# Patient Record
Sex: MALE | Race: WHITE | Employment: OTHER | URBAN - METROPOLITAN AREA
[De-identification: names, ages, dates, MRNs, and addresses within clinical notes are randomized per-mention and may not be internally consistent; named-entity substitution may affect disease eponyms.]

---

## 2017-02-11 ENCOUNTER — HOSPITAL ENCOUNTER (OUTPATIENT)
Dept: OTHER | Age: 47
Discharge: HOME | End: 2017-02-11
Attending: FAMILY MEDICINE | Admitting: FAMILY MEDICINE

## 2017-02-12 LAB
C-REACTIVE PROTEIN: 37.6 MG/L (ref 0–5.1)
FERRITIN: 194.7 NG/ML (ref 30–400)
IGA: 156 MG/DL (ref 70–400)
IRON SATURATION: 16 % (ref 20–50)
IRON: 49 UG/DL (ref 59–158)
TOTAL IRON BINDING CAPACITY: 297 UG/DL (ref 260–445)

## 2017-02-13 LAB — INSULIN: 42 UIU/ML

## 2017-05-11 ENCOUNTER — OFFICE VISIT (OUTPATIENT)
Dept: DERMATOLOGY | Age: 47
End: 2017-05-11

## 2017-05-11 DIAGNOSIS — L85.9 HYPERKERATOSIS: ICD-10-CM

## 2017-05-11 DIAGNOSIS — Z12.83 SCREENING EXAM FOR SKIN CANCER: ICD-10-CM

## 2017-05-11 DIAGNOSIS — L28.0 LICHEN SIMPLEX CHRONICUS: ICD-10-CM

## 2017-05-11 DIAGNOSIS — L30.4 INTERTRIGO: ICD-10-CM

## 2017-05-11 DIAGNOSIS — D22.9 MULTIPLE BENIGN NEVI: Primary | ICD-10-CM

## 2017-05-11 PROCEDURE — 99214 OFFICE O/P EST MOD 30 MIN: CPT | Performed by: DERMATOLOGY

## 2017-06-15 ENCOUNTER — TELEPHONE (OUTPATIENT)
Dept: PULMONOLOGY | Age: 47
End: 2017-06-15

## 2017-06-15 DIAGNOSIS — G47.33 OSA ON CPAP: Primary | ICD-10-CM

## 2017-06-15 DIAGNOSIS — Z99.89 OSA ON CPAP: Primary | ICD-10-CM

## 2017-08-16 ENCOUNTER — OFFICE VISIT (OUTPATIENT)
Dept: PULMONOLOGY | Age: 47
End: 2017-08-16

## 2017-08-16 VITALS
HEIGHT: 64 IN | WEIGHT: 315 LBS | SYSTOLIC BLOOD PRESSURE: 151 MMHG | DIASTOLIC BLOOD PRESSURE: 94 MMHG | RESPIRATION RATE: 16 BRPM | BODY MASS INDEX: 53.78 KG/M2 | HEART RATE: 80 BPM | TEMPERATURE: 98.4 F | OXYGEN SATURATION: 98 %

## 2017-08-16 DIAGNOSIS — G47.33 OSA ON CPAP: Primary | ICD-10-CM

## 2017-08-16 DIAGNOSIS — Z99.89 OSA ON CPAP: Primary | ICD-10-CM

## 2017-08-16 PROCEDURE — 99213 OFFICE O/P EST LOW 20 MIN: CPT | Performed by: INTERNAL MEDICINE

## 2017-08-16 ASSESSMENT — SLEEP AND FATIGUE QUESTIONNAIRES
HOW LIKELY ARE YOU TO NOD OFF OR FALL ASLEEP WHEN YOU ARE A PASSENGER IN A CAR FOR AN HOUR WITHOUT A BREAK: 2
NECK CIRCUMFERENCE (INCHES): 20
HOW LIKELY ARE YOU TO NOD OFF OR FALL ASLEEP IN A CAR, WHILE STOPPED FOR A FEW MINUTES IN TRAFFIC: 0
HOW LIKELY ARE YOU TO NOD OFF OR FALL ASLEEP WHILE WATCHING TV: 1
HOW LIKELY ARE YOU TO NOD OFF OR FALL ASLEEP WHILE LYING DOWN TO REST IN THE AFTERNOON WHEN CIRCUMSTANCES PERMIT: 2
ESS TOTAL SCORE: 5
HOW LIKELY ARE YOU TO NOD OFF OR FALL ASLEEP WHILE SITTING INACTIVE IN A PUBLIC PLACE: 0
HOW LIKELY ARE YOU TO NOD OFF OR FALL ASLEEP WHILE SITTING QUIETLY AFTER LUNCH WITHOUT ALCOHOL: 0
HOW LIKELY ARE YOU TO NOD OFF OR FALL ASLEEP WHILE SITTING AND TALKING TO SOMEONE: 0
HOW LIKELY ARE YOU TO NOD OFF OR FALL ASLEEP WHILE SITTING AND READING: 0

## 2017-10-27 ENCOUNTER — OFFICE VISIT (OUTPATIENT)
Dept: FAMILY MEDICINE CLINIC | Age: 47
End: 2017-10-27

## 2017-10-27 VITALS
BODY MASS INDEX: 53.78 KG/M2 | OXYGEN SATURATION: 95 % | DIASTOLIC BLOOD PRESSURE: 88 MMHG | WEIGHT: 315 LBS | HEIGHT: 64 IN | HEART RATE: 72 BPM | SYSTOLIC BLOOD PRESSURE: 140 MMHG

## 2017-10-27 DIAGNOSIS — Z99.89 OSA ON CPAP: ICD-10-CM

## 2017-10-27 DIAGNOSIS — R03.0 ELEVATED BLOOD PRESSURE READING: ICD-10-CM

## 2017-10-27 DIAGNOSIS — D35.2 PITUITARY MICROADENOMA (HCC): ICD-10-CM

## 2017-10-27 DIAGNOSIS — Z01.818 PRE-OP EVALUATION: Primary | ICD-10-CM

## 2017-10-27 DIAGNOSIS — G47.33 OSA ON CPAP: ICD-10-CM

## 2017-10-27 PROCEDURE — 99214 OFFICE O/P EST MOD 30 MIN: CPT | Performed by: FAMILY MEDICINE

## 2017-10-27 PROCEDURE — 93000 ELECTROCARDIOGRAM COMPLETE: CPT | Performed by: FAMILY MEDICINE

## 2017-10-27 RX ORDER — LOSARTAN POTASSIUM AND HYDROCHLOROTHIAZIDE 12.5; 5 MG/1; MG/1
1 TABLET ORAL DAILY
Qty: 30 TABLET | Refills: 5 | Status: SHIPPED | OUTPATIENT
Start: 2017-10-27 | End: 2018-04-19 | Stop reason: SDUPTHER

## 2017-10-27 ASSESSMENT — PATIENT HEALTH QUESTIONNAIRE - PHQ9
SUM OF ALL RESPONSES TO PHQ9 QUESTIONS 1 & 2: 0
SUM OF ALL RESPONSES TO PHQ QUESTIONS 1-9: 0
1. LITTLE INTEREST OR PLEASURE IN DOING THINGS: 0
2. FEELING DOWN, DEPRESSED OR HOPELESS: 0

## 2017-10-27 NOTE — PROGRESS NOTES
[address] [facility] Medicine Preoperative Evaluation       Erin C. Erie Merlin, M.D.     [address] [address], [address] [facility]     (phone) [phone]       (fax) [phone]    Dear [facility] Radiology? Preop Surgery ,        Thank you for referring Juan Antonio Gamble to me for Preoperative Evaluation -is being sedated for his MRI. Below are the relevant portions of my assessment and plan of care.     Juan Antonio Gamble    41 y.o.   1970    [address] [address]    Vitals:    10/27/17 1400 10/27/17 1424   BP: (!) 130/94 (!) 140/88   Pulse: 72    SpO2: 95%    Weight: (!) 381 lb (172.8 kg)    Height: 5' 4\" (1.626 m)       Wt Readings from Last 2 Encounters:   10/27/17 (!) 381 lb (172.8 kg)   08/16/17 (!) 371 lb (168.3 kg)     BP Readings from Last 3 Encounters:   10/27/17 (!) 140/88   08/16/17 (!) 151/94   12/30/16 136/86        Allergies   Allergen Reactions    Annalisa Quiñonez (Mag [Buffered Aspirin]      Nose bleeds     Metformin And Related Diarrhea     Current Outpatient Prescriptions   Medication Sig Dispense Refill    losartan-hydrochlorothiazide (HYZAAR) 50-12.5 MG per tablet Take 1 tablet by mouth daily 30 tablet 5    docusate sodium (COLACE) 100 MG capsule Take 1 capsule by mouth 2 times daily 60 capsule 2    ALPRAZolam (XANAX) 1 MG tablet Take 1 tablet by mouth as needed for Sleep or Anxiety Takes prn for anxiety 10 tablet 0    albuterol sulfate HFA (PROVENTIL HFA) 108 (90 BASE) MCG/ACT inhaler Inhale 2 puffs into the lungs every 4 hours as needed for Wheezing 1 Inhaler 1    Multiple Vitamins-Minerals (MULTIVITAMIN PO) Take by mouth      Fish Oil-Cholecalciferol (FISH OIL/D3 ADULT GUMMIES PO) Take 2 capsules by mouth daily      Coenzyme Q10 (COQ-10 PO) Take 2 capsules by mouth daily chewable      fluocinonide (LIDEX) 0.05 % ointment Apply sparingly to affected area(s) up to bid prn 15 g 1    HYDROcodone-acetaminophen (NORCO) 5-325 MG per tablet Take 1 tablet TIA-like symptoms. PSYCHIATRIC: No anxiety, insomnia or depression     Physical Exam   BP (!) 140/88   Pulse 72   Ht 5' 4\" (1.626 m)   Wt (!) 381 lb (172.8 kg)   SpO2 95%   BMI 65.40 kg/m²   Constitutional: Patient is oriented to person, place, and time. He appears in no distress. Head: Normocephalic and atraumatic. Right Ear: Tympanic membrane, external ear and ear canal normal.   Left Ear: Tympanic membrane, external ear and ear canal normal.   Nose: Nose normal.   Mouth/Throat: Oropharynx is clear and moist, and mucous membranes are normal.  There is no cervical adenopathy. There are no loose teeth. Eyes: Conjunctivae and extraocular motions are normal. Pupils are equal, round, and reactive to light bilaterally. Neck: Neck supple. No JVD present. No mass and no thyromegaly present. Cardiovascular: Normal rate, regular rhythm, normal heart sounds and intact distal pulses. Exam reveals no gallop and no friction rub. No murmur heard. Pulmonary/Chest: Effort normal and breath sounds normal. No respiratory distress. There are no wheezes, rhonchi or rales. Abdominal: Soft, non-tender. Normal bowel sounds and aorta. There is no organomegaly, bruit or palpable mass appreciated   Neurological: He is alert and oriented to person, place, and time. He has normal reflexes. No cranial nerve deficit. Coordination normal.   Skin: Skin is warm and dry. There is no rash or erythema. No suspicious lesions noted. Psychiatric: He has a normal mood and affect. Speech and behavior are normal. Judgment, cognition and memory are normal.     EKG Interpretation: Normal sinus rhythm no acute changes/attached    Uses CPAP at home     Labs= pending      Assessment:  Encounter Diagnoses   Name Primary?  Pre-op evaluation Yes    Pituitary microadenoma ([address]     Elevated blood pressure reading     CHESTER on CPAP         41 y.o. patient with planned surgery as above.     Known risk factors for perioperative complications: None        Plan:    1. Preoperative workup as follows: ECG and labs= pending   2. Change in medication regimen before surgery: none, continue med regimen including morning of surgery, w/sip of water. 3. Prophylaxis for cardiac events with perioperative beta-blockers: not indicated. 4. Invasive hemodynamic monitoring perioperatively: not indicated. 5. Patient is cleared for upcoming surgery. If you have questions, please do not hesitate to call me. Sincerely,        Jesse Burciaga.  Rachel Soliman M.D.

## 2017-11-06 DIAGNOSIS — R79.89 ELEVATED TSH: Primary | ICD-10-CM

## 2017-11-06 DIAGNOSIS — R73.09 ELEVATED HEMOGLOBIN A1C: ICD-10-CM

## 2017-11-06 LAB
ALBUMIN SERPL-MCNC: 3.8 G/DL (ref 3.5–5.7)
ALP BLD-CCNC: 55 U/L (ref 36–125)
ALT SERPL-CCNC: 19 U/L (ref 7–52)
ANION GAP SERPL CALCULATED.3IONS-SCNC: 9 MMOL/L (ref 3–16)
AST SERPL-CCNC: 15 U/L (ref 13–39)
BILIRUB SERPL-MCNC: 0.4 MG/DL (ref 0–1.5)
BUN BLDV-MCNC: 13 MG/DL (ref 7–25)
C-REACTIVE PROTEIN WIDE RANGE: 46.6 MG/L (ref 1–10)
CALCIUM SERPL-MCNC: 9.3 MG/DL (ref 8.6–10.3)
CHLORIDE BLD-SCNC: 102 MMOL/L (ref 98–110)
CHOLESTEROL, TOTAL: 187 MG/DL (ref 0–200)
CO2: 29 MMOL/L (ref 21–33)
CREAT SERPL-MCNC: 0.79 MG/DL (ref 0.6–1.3)
GFR, ESTIMATED: >90 SEE NOTE.
GFR, ESTIMATED: >90 SEE NOTE.
GLUCOSE BLD-MCNC: 93 MG/DL (ref 70–100)
HBA1C MFR BLD: 5.7 % (ref 4.8–6.4)
HCT VFR BLD CALC: 43.6 % (ref 38.5–50)
HDLC SERPL-MCNC: 35 MG/DL (ref 60–92)
HEMOGLOBIN: 14.6 G/DL (ref 13.2–17.1)
LDL CHOLESTEROL CALCULATED: 129 MG/DL
MCH RBC QN AUTO: 27.9 PG (ref 27–33)
MCHC RBC AUTO-ENTMCNC: 33.5 G/DL (ref 32–36)
MCV RBC AUTO: 83.5 FL (ref 80–100)
OSMOLALITY CALCULATION: 290 MOSM/KG (ref 278–305)
PDW BLD-RTO: 14.7 % (ref 11–15)
PLATELET # BLD: 235 10E3/UL (ref 140–400)
PMV BLD AUTO: 9.4 FL (ref 7.5–11.5)
POTASSIUM SERPL-SCNC: 3.8 MMOL/L (ref 3.5–5.3)
RBC # BLD: 5.23 10E6/UL (ref 4.2–5.8)
SODIUM BLD-SCNC: 140 MMOL/L (ref 133–146)
TOTAL PROTEIN: 6.4 G/DL (ref 6.4–8.9)
TRIGL SERPL-MCNC: 114 MG/DL (ref 10–149)
TSH, 3RD GENERATION: 5.66 UIU/ML (ref 0.34–5.6)
VITAMIN D 25-HYDROXY: 21.7 NG/ML (ref 30–100)
WBC # BLD: 8.3 10E3/UL (ref 3.8–10.8)

## 2017-11-10 ENCOUNTER — HOSPITAL ENCOUNTER (OUTPATIENT)
Dept: MRI IMAGING | Age: 47
Discharge: HOME | End: 2017-11-10
Attending: NEUROLOGICAL SURGERY | Admitting: NEUROLOGICAL SURGERY

## 2017-11-10 VITALS
RESPIRATION RATE: 20 BRPM | DIASTOLIC BLOOD PRESSURE: 82 MMHG | TEMPERATURE: 97.6 F | OXYGEN SATURATION: 95 % | HEART RATE: 82 BPM | SYSTOLIC BLOOD PRESSURE: 127 MMHG

## 2017-11-10 DIAGNOSIS — D35.2 BENIGN NEOPLASM OF PITUITARY GLAND (HCC): ICD-10-CM

## 2017-11-10 DIAGNOSIS — D35.2 PITUITARY ADENOMA (HCC): ICD-10-CM

## 2017-11-10 RX ORDER — OXYCODONE HYDROCHLORIDE AND ACETAMINOPHEN 5; 325 MG/1; MG/1
1 TABLET ORAL PRN
Status: ACTIVE | OUTPATIENT
Start: 2017-11-10 | End: 2017-11-10

## 2017-11-10 RX ORDER — MORPHINE SULFATE 2 MG/ML
2 INJECTION, SOLUTION INTRAMUSCULAR; INTRAVENOUS EVERY 5 MIN PRN
Status: DISCONTINUED | OUTPATIENT
Start: 2017-11-10 | End: 2017-11-11 | Stop reason: HOSPADM

## 2017-11-10 RX ORDER — FENTANYL CITRATE 50 UG/ML
25 INJECTION, SOLUTION INTRAMUSCULAR; INTRAVENOUS EVERY 5 MIN PRN
Status: DISCONTINUED | OUTPATIENT
Start: 2017-11-10 | End: 2017-11-11 | Stop reason: HOSPADM

## 2017-11-10 RX ORDER — ONDANSETRON 2 MG/ML
4 INJECTION INTRAMUSCULAR; INTRAVENOUS
Status: ACTIVE | OUTPATIENT
Start: 2017-11-10 | End: 2017-11-10

## 2017-11-10 RX ORDER — TESTOSTERONE 12.5 MG/1.25G
5 GEL TOPICAL DAILY
COMMUNITY
End: 2020-01-29

## 2017-11-10 RX ORDER — MEPERIDINE HYDROCHLORIDE 25 MG/ML
12.5 INJECTION INTRAMUSCULAR; INTRAVENOUS; SUBCUTANEOUS EVERY 5 MIN PRN
Status: DISCONTINUED | OUTPATIENT
Start: 2017-11-10 | End: 2017-11-11 | Stop reason: HOSPADM

## 2017-11-10 RX ORDER — FENTANYL CITRATE 50 UG/ML
50 INJECTION, SOLUTION INTRAMUSCULAR; INTRAVENOUS EVERY 5 MIN PRN
Status: DISCONTINUED | OUTPATIENT
Start: 2017-11-10 | End: 2017-11-11 | Stop reason: HOSPADM

## 2017-11-10 RX ORDER — MORPHINE SULFATE 2 MG/ML
1 INJECTION, SOLUTION INTRAMUSCULAR; INTRAVENOUS EVERY 5 MIN PRN
Status: DISCONTINUED | OUTPATIENT
Start: 2017-11-10 | End: 2017-11-11 | Stop reason: HOSPADM

## 2017-11-10 RX ORDER — OXYCODONE HYDROCHLORIDE AND ACETAMINOPHEN 5; 325 MG/1; MG/1
2 TABLET ORAL PRN
Status: ACTIVE | OUTPATIENT
Start: 2017-11-10 | End: 2017-11-10

## 2017-11-10 ASSESSMENT — PAIN - FUNCTIONAL ASSESSMENT: PAIN_FUNCTIONAL_ASSESSMENT: 0-10

## 2017-11-10 ASSESSMENT — LIFESTYLE VARIABLES: SMOKING_STATUS: 0

## 2017-11-10 ASSESSMENT — PAIN SCALES - GENERAL
PAINLEVEL_OUTOF10: 0
PAINLEVEL_OUTOF10: 0

## 2017-11-10 NOTE — PROGRESS NOTES
1.  Patient is identified using name and date of birth. 2.  The patient is free from signs and symptoms of injury. 3.  The patient receives appropriate medication(s), safely administered during the perioperative period. 4.  The patient had wound/tissuue perfusion consistent with or improved from baseline levels established preoperatively. 5.  The patient is at or returning to normothermia at the conclusion of the immediate postoperative period. 6.  The patient's fluid, electrolyte, and acid base balances are consistent with or improved from baseline levels established preoperatively. 7.  The patient's pulmonary function is consistent with or improved from baseline levels established preoperatively. 8.  The patient's cardiovascular status is consistent with or improved from baseline levels established preoperatively. 9.  The patient/caregiver participates in decisions affecting his or her perioperative care. 10. The patient's care is consistent with the individualized perioperative plan of care. 11. The patient's right to privacy is maintained. 12. The patient is the recipient of competent and ethical care within legal standards of practice. 13.  The patient's value system, lifestyle, ethnicity, and culture are considered, respected, and incorporated in the perioperative plan of care. 14.  The patient demonstrates and/or reports adequate pain control throughout the perioperative period. 15. The patient's neurological status is consistent with or improved from baseline levels established preoperatively. 16.  The patient/caregiver demonstrates knowledge of the expected responses to the operative or invasive procedure. 16.  Patient/caregiver has reduced anxiety. Interventions - familiarize with environment and equipment.
Alert and oriented. No c/o. Vss. Wife at bedside. Understands discharge instructions. Tolerated sitting up and po fluids well.
Dr Bibi Alvarez gave a verbal order to discharge patient.
VSS. Ok to transfer to phase 2 care.
established preoperatively. 23.  The patient/caregiver demonstrates knowledge of the expected responses to the operative or invasive procedure. 20.  Patient/Caregiver has reduced anxiety. Interventions-Familiarize with environment and equipment.   21.  Other:  22.  Other:

## 2017-11-10 NOTE — ANESTHESIA POST-OP
[facility] Department of Anesthesiology  Post-Anesthesia Note       Name:  Ruiz Gant                                         Age:  41 y.o.   MRN:  [id number]     Last Vitals & Oxygen Saturation: BP (!) 135/93   Pulse 94   Temp 97.6 °F (36.4 °C) (Temporal)   Resp 25   SpO2 95%   Patient Vitals for the past 4 hrs:   BP Temp Temp src Pulse Resp SpO2   11/10/17 1059 - - - 94 25 95 %   11/10/17 1056 - - - 85 17 99 %   11/10/17 1055 (!) 135/93 - - 89 27 100 %   11/10/17 1054 - - - 87 18 97 %   11/10/17 1053 - - - 88 20 98 %   11/10/17 1052 (!) 131/93 - - 85 23 98 %   11/10/17 1044 132/88 97.6 °F (36.4 °C) Temporal 94 12 99 %   11/10/17 0813 (!) 150/61 97.1 °F (36.2 °C) Temporal 65 18 97 %       Level of consciousness: awake, alert and oriented    Respiratory: stable     Cardiovascular: stable     Hydration: stable     PONV: stable     Post-op pain: adequate analgesia    Post-op assessment: no apparent anesthetic complications and tolerated procedure well    Complications:  none    Lizbeth Irby MD  November 10, 2017   11:25 AM

## 2017-11-10 NOTE — ANESTHESIA PRE-OP
(Current)   Vitals:    11/10/17 0813   BP: (!) 150/61   Pulse: 65   Resp: 18   Temp: 97.1 °F (36.2 °C)   SpO2: 97%     Vital Signs Statistics (for past 48 hrs)     Temp  Av.1 °F (36.2 °C)  Min: 97.1 °F (36.2 °C)   Min taken time: 11/10/17 0813  Max: 97.1 °F (36.2 °C)   Max taken time: 11/10/17 0813  Pulse  Av  Min: 72   Min taken time: 11/10/17 0813  Max: 72   Max taken time: 11/10/17 0813  Resp  Av  Min: 18   Min taken time: 11/10/17 0813  Max: 25   Max taken time: 11/10/17 0813  BP  Min: 150/61   Min taken time: 11/10/17 0813  Max: 150/61   Max taken time: 11/10/17 0813  SpO2  Av %  Min: 97 %   Min taken time: 11/10/17 0813  Max: 97 %   Max taken time: 11/10/17 0813    BP Readings from Last 3 Encounters:   11/10/17 (!) 150/61   10/27/17 (!) 140/88   17 (!) 151/94     BMI  There is no height or weight on file to calculate BMI. Estimated body mass index is 65.4 kg/m² as calculated from the following:    Height as of 10/27/17: 5' 4\" (1.626 m). Weight as of 10/27/17: 381 lb (172.8 kg).     CBC   Lab Results   Component Value Date    WBC 8.3 2017    RBC 5.23 2017    HGB 14.6 2017    HCT 43.6 2017    MCV 83.5 2017    RDW 14.7 2017     2017     CMP    Lab Results   Component Value Date     2017    K 3.8 2017     2017    CO2 29 2017    BUN 13 2017    CREATININE 0.79 2017    GFRAA >60 2016    AGRATIO 1.4 2016    LABGLOM >60 2016    LABGLOM 105 2012    GLUCOSE 93 2017    PROT 6.4 2017    PROT 5.9 2012    CALCIUM 9.3 2017    BILITOT 0.4 2017    ALKPHOS 55 2017    AST 15 2017    ALT 19 2017     BMP    Lab Results   Component Value Date     2017    K 3.8 2017     2017    CO2 29 2017    BUN 13 2017    CREATININE 0.79 2017    CALCIUM 9.3 2017    GFRAA >60 2016    LABGLOM >60 03/23/2016    LABGLOM 105 06/23/2012    GLUCOSE 93 11/06/2017     POCGlucose  No results for input(s): GLUCOSE in the last 72 hours. Coags  No results found for: PROTIME, INR, APTT  HCG (If Applicable) No results found for: PREGTESTUR, PREGSERUM, HCG, HCGQUANT   ABGs No results found for: PHART, PO2ART, NRN0HWO, CSF5TZN, BEART, Y1NSNWSG   Type & Screen (If Applicable)  No results found for: LABABO, [address]    Surgeon:    Procedure:    Medications prior to admission:   Prior to Admission medications    Medication Sig Start Date End Date Taking? Authorizing Provider   testosterone (ANDROGEL) 25 MG/2.5GM (1%) GEL 1 % gel Apply 5 g topically daily . Yes Historical Provider, MD   losartan-hydrochlorothiazide (HYZAAR) 50-12.5 MG per tablet Take 1 tablet by mouth daily 10/27/17  Yes Del Kay MD   docusate sodium (COLACE) 100 MG capsule Take 1 capsule by mouth 2 times daily 12/30/16  Yes Del Kay MD   Coenzyme Q10 (COQ-10 PO) Take 2 capsules by mouth daily chewable   Yes Historical Provider, MD   fluocinonide (LIDEX) 0.05 % ointment Apply sparingly to affected area(s) up to bid prn 1/29/15  Yes Sandra Holt MD   ALPRAZolam Alyson Single) 1 MG tablet Take 1 tablet by mouth as needed for Sleep or Anxiety Takes prn for anxiety 12/30/16   Del Kay MD   albuterol sulfate HFA (PROVENTIL HFA) 108 (90 BASE) MCG/ACT inhaler Inhale 2 puffs into the lungs every 4 hours as needed for Wheezing 12/30/16   Del Kay MD   Multiple Vitamins-Minerals (MULTIVITAMIN PO) Take by mouth    Historical Provider, MD   Fish Oil-Cholecalciferol (FISH OIL/D3 ADULT GUMMIES PO) Take 2 capsules by mouth daily    Historical Provider, MD   HYDROcodone-acetaminophen (NORCO) 5-325 MG per tablet Take 1 tablet by mouth every 8 hours as needed for Pain.  12/12/14   Del Kay MD       Current medications:    Current Outpatient Prescriptions   Medication Sig Dispense Refill    testosterone (ANDROGEL) 25 MG/2.5GM (1%) GEL 1 % gel Apply 5 g MOUTH SURGERY      gum transplant    PITUITARY SURGERY  03/2016    WISDOM TOOTH EXTRACTION         Social History:    Social History   Substance Use Topics    Smoking status: Former Smoker     Packs/day: 1.00     Years: 3.00     Types: Cigarettes     Quit date: 2/6/1990    Smokeless tobacco: Never Used    Alcohol use 0.0 oz/week      Comment: social                                Counseling given: Not Answered      Vital Signs (Current):   Vitals:    11/10/17 0813   BP: (!) 150/61   Pulse: 65   Resp: 18   Temp: 97.1 °F (36.2 °C)   TempSrc: Temporal   SpO2: 97%                                              BP Readings from Last 3 Encounters:   11/10/17 (!) 150/61   10/27/17 (!) 140/88   08/16/17 (!) 151/94       NPO Status: Time of last liquid consumption: 2200                        Time of last solid consumption: 2200                        Date of last liquid consumption: 11/09/17                        Date of last solid food consumption: 11/09/17    BMI:   Wt Readings from Last 3 Encounters:   10/27/17 (!) 381 lb (172.8 kg)   08/16/17 (!) 371 lb (168.3 kg)   12/30/16 (!) 364 lb (165.1 kg)     There is no height or weight on file to calculate BMI.     Anesthesia Evaluation  Patient summary reviewed and Nursing notes reviewed no history of anesthetic complications:   Airway: Mallampati: II  TM distance: >3 FB   Neck ROM: full  Mouth opening: > = 3 FB Dental:          Pulmonary: breath sounds clear to auscultation  (+) sleep apnea: on CPAP,  asthma (prn inhaler): seasonal asthma,                            Cardiovascular:    (+) hypertension:,     (-) pacemaker, valvular problems/murmurs, past MI, CAD, CABG/stent, dysrhythmias and  angina    ECG reviewed  Rhythm: regular  Rate: normal           Beta Blocker:  Not on Beta Blocker         Neuro/Psych:   (+) headaches:,    (-) seizures, neuromuscular disease, TIA and CVA           GI/Hepatic/Renal: neg ROS            Endo/Other: negative ROS

## 2017-12-07 ENCOUNTER — TELEPHONE (OUTPATIENT)
Dept: DERMATOLOGY | Age: 47
End: 2017-12-07

## 2017-12-07 RX ORDER — CLOBETASOL PROPIONATE 0.5 MG/G
OINTMENT TOPICAL
Qty: 60 G | Refills: 0 | Status: SHIPPED | OUTPATIENT
Start: 2017-12-07

## 2017-12-07 NOTE — TELEPHONE ENCOUNTER
Pt calling for refill of lidex oint. Will change to clobetasol oint since $0 copay through [facility].

## 2018-01-02 RX ORDER — DOCUSATE SODIUM 100 MG/1
100 CAPSULE, LIQUID FILLED ORAL 2 TIMES DAILY
Qty: 60 CAPSULE | Refills: 2 | Status: SHIPPED | OUTPATIENT
Start: 2018-01-02 | End: 2018-04-19 | Stop reason: SDUPTHER

## 2018-04-03 ENCOUNTER — TELEPHONE (OUTPATIENT)
Dept: PULMONOLOGY | Age: 48
End: 2018-04-03

## 2018-05-17 ENCOUNTER — OFFICE VISIT (OUTPATIENT)
Dept: DERMATOLOGY | Age: 48
End: 2018-05-17

## 2018-05-17 DIAGNOSIS — Z12.83 SCREENING EXAM FOR SKIN CANCER: ICD-10-CM

## 2018-05-17 DIAGNOSIS — L81.4 SOLAR LENTIGO: ICD-10-CM

## 2018-05-17 DIAGNOSIS — L72.0 EPIDERMOID CYST: ICD-10-CM

## 2018-05-17 DIAGNOSIS — L82.0 INFLAMED SEBORRHEIC KERATOSIS: ICD-10-CM

## 2018-05-17 DIAGNOSIS — D22.9 MULTIPLE BENIGN NEVI: Primary | ICD-10-CM

## 2018-05-17 PROCEDURE — 17110 DESTRUCTION B9 LES UP TO 14: CPT | Performed by: DERMATOLOGY

## 2018-05-17 PROCEDURE — 99213 OFFICE O/P EST LOW 20 MIN: CPT | Performed by: DERMATOLOGY

## 2018-05-17 RX ORDER — CHOLECALCIFEROL (VITAMIN D3) 1250 MCG
CAPSULE ORAL WEEKLY
COMMUNITY

## 2018-05-25 ENCOUNTER — OFFICE VISIT (OUTPATIENT)
Dept: PULMONOLOGY | Age: 48
End: 2018-05-25

## 2018-05-25 VITALS
OXYGEN SATURATION: 99 % | BODY MASS INDEX: 53.78 KG/M2 | WEIGHT: 315 LBS | HEIGHT: 64 IN | RESPIRATION RATE: 18 BRPM | TEMPERATURE: 98 F | DIASTOLIC BLOOD PRESSURE: 84 MMHG | SYSTOLIC BLOOD PRESSURE: 142 MMHG | HEART RATE: 78 BPM

## 2018-05-25 DIAGNOSIS — G47.33 OSA ON CPAP: Primary | ICD-10-CM

## 2018-05-25 DIAGNOSIS — E66.01 MORBID OBESITY (HCC): ICD-10-CM

## 2018-05-25 DIAGNOSIS — Z99.89 OSA ON CPAP: Primary | ICD-10-CM

## 2018-05-25 PROCEDURE — 99213 OFFICE O/P EST LOW 20 MIN: CPT | Performed by: INTERNAL MEDICINE

## 2018-05-25 RX ORDER — LEVOTHYROXINE SODIUM 75 UG/1
CAPSULE ORAL
COMMUNITY
Start: 2018-05-23

## 2018-05-25 ASSESSMENT — SLEEP AND FATIGUE QUESTIONNAIRES
NECK CIRCUMFERENCE (INCHES): 20.5
HOW LIKELY ARE YOU TO NOD OFF OR FALL ASLEEP WHILE LYING DOWN TO REST IN THE AFTERNOON WHEN CIRCUMSTANCES PERMIT: 1
HOW LIKELY ARE YOU TO NOD OFF OR FALL ASLEEP WHILE WATCHING TV: 0
HOW LIKELY ARE YOU TO NOD OFF OR FALL ASLEEP WHILE SITTING AND TALKING TO SOMEONE: 0
HOW LIKELY ARE YOU TO NOD OFF OR FALL ASLEEP WHILE SITTING QUIETLY AFTER LUNCH WITHOUT ALCOHOL: 0
HOW LIKELY ARE YOU TO NOD OFF OR FALL ASLEEP WHILE SITTING AND READING: 0
HOW LIKELY ARE YOU TO NOD OFF OR FALL ASLEEP WHILE SITTING INACTIVE IN A PUBLIC PLACE: 0
HOW LIKELY ARE YOU TO NOD OFF OR FALL ASLEEP IN A CAR, WHILE STOPPED FOR A FEW MINUTES IN TRAFFIC: 0
HOW LIKELY ARE YOU TO NOD OFF OR FALL ASLEEP WHEN YOU ARE A PASSENGER IN A CAR FOR AN HOUR WITHOUT A BREAK: 2
ESS TOTAL SCORE: 3

## 2018-09-24 ENCOUNTER — TELEPHONE (OUTPATIENT)
Dept: FAMILY MEDICINE CLINIC | Age: 48
End: 2018-09-24

## 2018-09-24 DIAGNOSIS — E78.00 PURE HYPERCHOLESTEROLEMIA: ICD-10-CM

## 2018-09-24 DIAGNOSIS — Z00.00 ANNUAL PHYSICAL EXAM: ICD-10-CM

## 2018-09-24 DIAGNOSIS — R73.03 PREDIABETES: Primary | ICD-10-CM

## 2018-10-01 ENCOUNTER — OFFICE VISIT (OUTPATIENT)
Dept: FAMILY MEDICINE CLINIC | Age: 48
End: 2018-10-01
Payer: COMMERCIAL

## 2018-10-01 VITALS
WEIGHT: 315 LBS | DIASTOLIC BLOOD PRESSURE: 88 MMHG | HEIGHT: 64 IN | SYSTOLIC BLOOD PRESSURE: 122 MMHG | BODY MASS INDEX: 53.78 KG/M2 | HEART RATE: 90 BPM | OXYGEN SATURATION: 97 %

## 2018-10-01 DIAGNOSIS — Z99.89 OSA ON CPAP: ICD-10-CM

## 2018-10-01 DIAGNOSIS — E78.2 MIXED HYPERLIPIDEMIA: ICD-10-CM

## 2018-10-01 DIAGNOSIS — Z01.818 PRE-OP EVALUATION: ICD-10-CM

## 2018-10-01 DIAGNOSIS — G47.33 OSA ON CPAP: ICD-10-CM

## 2018-10-01 DIAGNOSIS — R73.09 ELEVATED HEMOGLOBIN A1C: ICD-10-CM

## 2018-10-01 DIAGNOSIS — E78.00 PURE HYPERCHOLESTEROLEMIA: ICD-10-CM

## 2018-10-01 DIAGNOSIS — F41.9 ANXIETY: ICD-10-CM

## 2018-10-01 DIAGNOSIS — Z00.00 ANNUAL PHYSICAL EXAM: ICD-10-CM

## 2018-10-01 DIAGNOSIS — R73.03 PREDIABETES: ICD-10-CM

## 2018-10-01 DIAGNOSIS — Z00.00 ANNUAL PHYSICAL EXAM: Primary | ICD-10-CM

## 2018-10-01 DIAGNOSIS — I10 ESSENTIAL HYPERTENSION: ICD-10-CM

## 2018-10-01 LAB
BASOPHILS ABSOLUTE: 0.1 K/UL (ref 0–0.2)
BASOPHILS RELATIVE PERCENT: 0.6 %
BILIRUBIN, POC: NORMAL
BLOOD URINE, POC: NORMAL
CLARITY, POC: CLEAR
COLOR, POC: YELLOW
EOSINOPHILS ABSOLUTE: 0.2 K/UL (ref 0–0.6)
EOSINOPHILS RELATIVE PERCENT: 1.6 %
GLUCOSE URINE, POC: NORMAL
HCT VFR BLD CALC: 45.1 % (ref 40.5–52.5)
HEMOGLOBIN: 14.6 G/DL (ref 13.5–17.5)
KETONES, POC: NORMAL
LEUKOCYTE EST, POC: NORMAL
LYMPHOCYTES ABSOLUTE: 1.5 K/UL (ref 1–5.1)
LYMPHOCYTES RELATIVE PERCENT: 15.4 %
MCH RBC QN AUTO: 28 PG (ref 26–34)
MCHC RBC AUTO-ENTMCNC: 32.3 G/DL (ref 31–36)
MCV RBC AUTO: 86.9 FL (ref 80–100)
MONOCYTES ABSOLUTE: 0.7 K/UL (ref 0–1.3)
MONOCYTES RELATIVE PERCENT: 7.2 %
NEUTROPHILS ABSOLUTE: 7.4 K/UL (ref 1.7–7.7)
NEUTROPHILS RELATIVE PERCENT: 75.2 %
NITRITE, POC: NORMAL
PDW BLD-RTO: 15 % (ref 12.4–15.4)
PH, POC: 7
PLATELET # BLD: 307 K/UL (ref 135–450)
PMV BLD AUTO: 8.6 FL (ref 5–10.5)
PROTEIN, POC: NORMAL
RBC # BLD: 5.19 M/UL (ref 4.2–5.9)
SPECIFIC GRAVITY, POC: 1.01
UROBILINOGEN, POC: 0.2
WBC # BLD: 9.8 K/UL (ref 4–11)

## 2018-10-01 PROCEDURE — 99396 PREV VISIT EST AGE 40-64: CPT | Performed by: FAMILY MEDICINE

## 2018-10-01 PROCEDURE — 81002 URINALYSIS NONAUTO W/O SCOPE: CPT | Performed by: FAMILY MEDICINE

## 2018-10-01 RX ORDER — LOSARTAN POTASSIUM AND HYDROCHLOROTHIAZIDE 12.5; 1 MG/1; MG/1
1 TABLET ORAL DAILY
Qty: 90 TABLET | Refills: 3 | Status: SHIPPED | OUTPATIENT
Start: 2018-10-01 | End: 2019-05-15 | Stop reason: ALTCHOICE

## 2018-10-01 RX ORDER — DULOXETIN HYDROCHLORIDE 30 MG/1
60 CAPSULE, DELAYED RELEASE ORAL DAILY
COMMUNITY
Start: 2018-09-10 | End: 2019-07-11 | Stop reason: CLARIF

## 2018-10-01 RX ORDER — DOCUSATE SODIUM 100 MG/1
100 CAPSULE, LIQUID FILLED ORAL 2 TIMES DAILY
Qty: 120 CAPSULE | Refills: 3 | Status: SHIPPED | OUTPATIENT
Start: 2018-10-01

## 2018-10-01 RX ORDER — ALPRAZOLAM 1 MG/1
1 TABLET ORAL PRN
Qty: 10 TABLET | Refills: 0 | Status: SHIPPED | OUTPATIENT
Start: 2018-10-01 | End: 2019-01-29

## 2018-10-01 ASSESSMENT — PATIENT HEALTH QUESTIONNAIRE - PHQ9
SUM OF ALL RESPONSES TO PHQ QUESTIONS 1-9: 1
SUM OF ALL RESPONSES TO PHQ9 QUESTIONS 1 & 2: 1
1. LITTLE INTEREST OR PLEASURE IN DOING THINGS: 0
SUM OF ALL RESPONSES TO PHQ QUESTIONS 1-9: 1
2. FEELING DOWN, DEPRESSED OR HOPELESS: 1

## 2018-10-02 LAB
C-REACTIVE PROTEIN: 43.4 MG/L (ref 0–5.1)
CHOLESTEROL, TOTAL: 186 MG/DL (ref 0–199)
ESTIMATED AVERAGE GLUCOSE: 119.8 MG/DL
HBA1C MFR BLD: 5.8 %
HBV SURFACE AB TITR SER: <3.5 MIU/ML
HDLC SERPL-MCNC: 43 MG/DL (ref 40–60)
LDL CHOLESTEROL CALCULATED: 121 MG/DL
TRIGL SERPL-MCNC: 110 MG/DL (ref 0–150)
VLDLC SERPL CALC-MCNC: 22 MG/DL

## 2018-10-03 LAB — HAV AB SERPL IA-ACNC: NEGATIVE

## 2019-05-14 ENCOUNTER — TELEPHONE (OUTPATIENT)
Dept: FAMILY MEDICINE CLINIC | Age: 49
End: 2019-05-14

## 2019-05-14 NOTE — PROGRESS NOTES
Lilian Rhoades is a 54 y.o. male. HPI:  URI for 3 weeks and now in chest, having coughing fits  Using albuterol with minimal relief  Thickish phlegm and he coughs, feels it mainly in his chest  Sinuses are better but still having some postnasal drip  Meds, vitamins and allergies reviewed with pt  Wt Readings from Last 3 Encounters:   05/15/19 (!) 402 lb (182.3 kg)   10/01/18 (!) 372 lb (168.7 kg)   05/25/18 (!) 392 lb (177.8 kg)       REVIEW OF SYSTEMS:   CONSTITUTIONAL: See history of present illness,   Weight gain noted   HEENT: No new vision difficulties or ringing in the ears. RESPIRATORY: See history of present illness. CARDIOVASCULAR: no CP, palpitations or SOB with exertion  GI: No nausea, vomiting, diarrhea, constipation, abdominal pain or changes in bowel habits. : No urinary frequency, urgency, incontinence hematuria or dysuria. SKIN: No cyanosis or skin lesions. MUSCULOSKELETAL: No new muscle or joint pain. NEUROLOGICAL: No syncope or TIA-like symptoms. PSYCHIATRIC: No anxiety, insomnia or depression     Allergies   Allergen Reactions    Adhesive Tape Dermatitis     Causes skin to get red    Asa Buff (Mag [Buffered Aspirin]      Nose bleeds     Metformin And Related Diarrhea       Prior to Visit Medications    Medication Sig Taking? Authorizing Provider   doxycycline hyclate (VIBRA-TABS) 100 MG tablet Take 1 tablet by mouth 2 times daily for 7 days Yes Brian El MD   predniSONE (DELTASONE) 10 MG tablet Take 40 mgs by mouth for 3 days, then 30 mg for 3 days, then 20 mg for 3 days then 10 mg.  Yes Brian El MD   budesonide-formoterol (SYMBICORT) 160-4.5 MCG/ACT AERO Inhale 2 puffs into the lungs 2 times daily Yes Brian El MD   DULoxetine (CYMBALTA) 30 MG extended release capsule Take 60 mg by mouth daily Yes Historical Provider, MD   losartan-hydrochlorothiazide (HYZAAR) 100-12.5 MG per tablet Take 1 tablet by mouth daily Yes Brian El MD   docusate sodium (COLACE) 100 MG capsule Take 1 capsule by mouth 2 times daily Yes Latasha Headley MD   Levothyroxine Sodium 75 MCG CAPS  Yes Historical Provider, MD   Cholecalciferol (VITAMIN D3) 29843 units CAPS Take by mouth once a week Yes Historical Provider, MD   PROAIR  (90 Base) MCG/ACT inhaler INHALE TWO PUFFS INTO THE LUNGS EVERY 4 HOURS AS NEEDED FOR WHEEZING Yes Latasha Headley MD   clobetasol (TEMOVATE) 0.05 % ointment Apply sparingly to affected area(s) bid prn for flares, up to 2 weeks at a time. Do not apply on cleared skin. Yes Tyler Lamas MD   testosterone (ANDROGEL) 25 MG/2.5GM (1%) GEL 1 % gel Apply 5 g topically daily . Yes Historical Provider, MD   Multiple Vitamins-Minerals (MULTIVITAMIN PO) Take by mouth Yes Historical Provider, MD   Fish Oil-Cholecalciferol (FISH OIL/D3 ADULT GUMMIES PO) Take 2 capsules by mouth daily Yes Historical Provider, MD   Coenzyme Q10 (COQ-10 PO) Take 2 capsules by mouth daily chewable Yes Historical Provider, MD   HYDROcodone-acetaminophen (NORCO) 5-325 MG per tablet Take 1 tablet by mouth every 8 hours as needed for Pain. 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[address] 2016    Sleep apnea     uses c-pap       Social History     Tobacco Use    Smoking status: Former Smoker     Packs/day: 1.00     Years: 3.00     Pack years: 3.00     Types: Cigarettes     Last attempt to quit: 1990     Years since quittin.2    Smokeless tobacco: Never Used   Substance Use Topics    Alcohol use:  Yes     Alcohol/week: 0.0 oz     Comment: social       Family History   Problem Relation Age of Onset    Breast Cancer Mother     Thyroid Disease Mother     Heart Disease Maternal Grandfather     Heart Attack Maternal Grandfather OBJECTIVE:  /80   Pulse 88   Wt (!) 402 lb (182.3 kg)   SpO2 95%   BMI 69.00 kg/m²   GEN:  In complaint of his chest congestion and some postnasal drip  HEENT:  NCAT, TMs:normal and throat: clear  NECK:  Supple without adenopathy. CV:  Regular rate and rhythm, S1 and S2 normal, no murmurs, clicks  PULM:  Chest with expiratory cough  ABD: Soft, NT, obese  EXT: No rash or edema  NEURO: Alert oriented ×3, no assistive device    ASSESSMENT/PLAN:  1. Bronchitis with bronchospasm  - doxycycline hyclate (VIBRA-TABS) 100 MG tablet; Take 1 tablet by mouth 2 times daily for 7 days  Dispense: 14 tablet; Refill: 0  Take antibiotic with food/water and probiotic  - predniSONE (DELTASONE) 10 MG tablet; Take 40 mgs by mouth for 3 days, then 30 mg for 3 days, then 20 mg for 3 days then 10 mg. Dispense: 30 tablet; Refill: 0  - budesonide-formoterol (SYMBICORT) 160-4.5 MCG/ACT AERO; Inhale 2 puffs into the lungs 2 times daily  Dispense: 1 Inhaler; Refill: 0    2.  Postnasal drip  Use over-the-counter Flonase  Avoid Mucinex in the evening    Follow up if sxs persist or worsen and for routine care with PCP

## 2019-05-14 NOTE — TELEPHONE ENCOUNTER
Cough, asthma and SOB, using inhaler 5 to 6 times a day. 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    [phone] wife Felicity Gilford

## 2019-05-15 ENCOUNTER — OFFICE VISIT (OUTPATIENT)
Dept: FAMILY MEDICINE CLINIC | Age: 49
End: 2019-05-15
Payer: COMMERCIAL

## 2019-05-15 VITALS
DIASTOLIC BLOOD PRESSURE: 80 MMHG | WEIGHT: 315 LBS | BODY MASS INDEX: 69 KG/M2 | OXYGEN SATURATION: 95 % | HEART RATE: 88 BPM | SYSTOLIC BLOOD PRESSURE: 120 MMHG

## 2019-05-15 DIAGNOSIS — R09.82 POSTNASAL DRIP: ICD-10-CM

## 2019-05-15 DIAGNOSIS — J20.9 BRONCHITIS WITH BRONCHOSPASM: Primary | ICD-10-CM

## 2019-05-15 PROCEDURE — 99213 OFFICE O/P EST LOW 20 MIN: CPT | Performed by: FAMILY MEDICINE

## 2019-05-15 RX ORDER — BUDESONIDE AND FORMOTEROL FUMARATE DIHYDRATE 160; 4.5 UG/1; UG/1
2 AEROSOL RESPIRATORY (INHALATION) 2 TIMES DAILY
Qty: 1 INHALER | Refills: 0 | COMMUNITY
Start: 2019-05-15 | End: 2019-07-11 | Stop reason: CLARIF

## 2019-05-15 RX ORDER — DOXYCYCLINE HYCLATE 100 MG
100 TABLET ORAL 2 TIMES DAILY
Qty: 14 TABLET | Refills: 0 | Status: SHIPPED | OUTPATIENT
Start: 2019-05-15 | End: 2019-05-22

## 2019-05-15 RX ORDER — LISINOPRIL AND HYDROCHLOROTHIAZIDE 20; 12.5 MG/1; MG/1
1 TABLET ORAL DAILY
Qty: 90 TABLET | Refills: 1 | Status: SHIPPED | OUTPATIENT
Start: 2019-05-15 | End: 2019-06-05 | Stop reason: SDUPTHER

## 2019-05-15 RX ORDER — PREDNISONE 10 MG/1
TABLET ORAL
Qty: 30 TABLET | Refills: 0 | Status: SHIPPED | OUTPATIENT
Start: 2019-05-15 | End: 2019-05-25

## 2019-05-15 ASSESSMENT — PATIENT HEALTH QUESTIONNAIRE - PHQ9
SUM OF ALL RESPONSES TO PHQ QUESTIONS 1-9: 0
SUM OF ALL RESPONSES TO PHQ QUESTIONS 1-9: 0
1. LITTLE INTEREST OR PLEASURE IN DOING THINGS: 0
SUM OF ALL RESPONSES TO PHQ9 QUESTIONS 1 & 2: 0
2. FEELING DOWN, DEPRESSED OR HOPELESS: 0

## 2019-06-04 ENCOUNTER — TELEPHONE (OUTPATIENT)
Dept: FAMILY MEDICINE CLINIC | Age: 49
End: 2019-06-04

## 2019-06-04 NOTE — TELEPHONE ENCOUNTER
Spoke with patient and he states he cannot come in today , he asked for the appointment for tomorrow .  Pt scheduled for tomorrow at 4pm.

## 2019-06-04 NOTE — TELEPHONE ENCOUNTER
Patient is requesting to be seen today for a cough that has continued for a few weeks, please call patient and advise

## 2019-06-05 ENCOUNTER — OFFICE VISIT (OUTPATIENT)
Dept: FAMILY MEDICINE CLINIC | Age: 49
End: 2019-06-05
Payer: COMMERCIAL

## 2019-06-05 VITALS
BODY MASS INDEX: 69.17 KG/M2 | DIASTOLIC BLOOD PRESSURE: 62 MMHG | OXYGEN SATURATION: 99 % | SYSTOLIC BLOOD PRESSURE: 118 MMHG | HEART RATE: 88 BPM | WEIGHT: 315 LBS

## 2019-06-05 DIAGNOSIS — J20.9 BRONCHITIS WITH BRONCHOSPASM: Primary | ICD-10-CM

## 2019-06-05 DIAGNOSIS — R05.9 COUGH: ICD-10-CM

## 2019-06-05 DIAGNOSIS — I10 ESSENTIAL HYPERTENSION: ICD-10-CM

## 2019-06-05 PROCEDURE — 99213 OFFICE O/P EST LOW 20 MIN: CPT | Performed by: FAMILY MEDICINE

## 2019-06-05 RX ORDER — ALPRAZOLAM 1 MG/1
1 TABLET ORAL NIGHTLY PRN
COMMUNITY
End: 2020-01-29 | Stop reason: SDUPTHER

## 2019-06-05 RX ORDER — PROMETHAZINE HYDROCHLORIDE AND CODEINE PHOSPHATE 6.25; 1 MG/5ML; MG/5ML
5 SYRUP ORAL EVERY 6 HOURS PRN
Qty: 118 ML | Refills: 0 | Status: SHIPPED | OUTPATIENT
Start: 2019-06-05 | End: 2019-06-08

## 2019-06-05 RX ORDER — LISINOPRIL AND HYDROCHLOROTHIAZIDE 20; 12.5 MG/1; MG/1
1 TABLET ORAL DAILY
Qty: 90 TABLET | Refills: 1 | Status: SHIPPED | OUTPATIENT
Start: 2019-06-05 | End: 2020-01-29

## 2019-06-05 RX ORDER — DOXYCYCLINE HYCLATE 100 MG
100 TABLET ORAL 2 TIMES DAILY
Qty: 14 TABLET | Refills: 0 | Status: SHIPPED | OUTPATIENT
Start: 2019-06-05 | End: 2019-06-12

## 2019-06-05 RX ORDER — PREDNISONE 10 MG/1
TABLET ORAL
Qty: 30 TABLET | Refills: 0 | Status: SHIPPED | OUTPATIENT
Start: 2019-06-05 | End: 2019-06-15

## 2019-06-05 NOTE — PROGRESS NOTES
attempt to quit: 1990     Years since quittin.3    Smokeless tobacco: Never Used   Substance Use Topics    Alcohol use: Yes     Alcohol/week: 0.0 oz     Comment: social       Family History   Problem Relation Age of Onset    Breast Cancer Mother     Thyroid Disease Mother     Heart Disease Maternal Grandfather     Heart Attack Maternal Grandfather        OBJECTIVE:  /62   Pulse 88   Wt (!) 403 lb (182.8 kg)   SpO2 99%   BMI 69.17 kg/m²   GEN:  in NAD  HEENT:  NCAT, TMs:normal and throat: clear  NECK:  Supple without adenopathy. CV:  Regular rate and rhythm, S1 and S2 normal, no murmurs, clicks  PULM:  Chest is clear, no wheezing ,  symmetric air entry throughout both lung fields. ABD: Soft, NT  EXT: No rash or edema  NEURO: Alert oriented ×3    ASSESSMENT/PLAN:  1. Bronchitis with bronchospasm  - doxycycline hyclate (VIBRA-TABS) 100 MG tablet; Take 1 tablet by mouth 2 times daily for 7 days  Dispense: 14 tablet; Refill: 0  Take antibiotic with food/water and probiotic  - predniSONE (DELTASONE) 10 MG tablet; Take 40 mgs by mouth for 3 days, then 30 mg for 3 days, then 20 mg for 3 days then 10 mg. Dispense: 30 tablet; Refill: 0  - mometasone-formoterol (DULERA) 200-5 MCG/ACT inhaler; Inhale 2 puffs into the lungs 2 times daily  Dispense: 1 Inhaler; Refill: 0    If no improvement , recommend he see his pulmonologist    2. Cough  - promethazine-codeine (PHENERGAN WITH CODEINE) 6.25-10 MG/5ML syrup; Take 5 mLs by mouth every 6 hours as needed for Cough for up to 3 days. Dispense: 118 mL; Refill: 0    3. BMI 60.0-69.9, adult (HCC)  Advise healthy diet exercise and weight loss and feeling well enough    4. Essential hypertension  TB medication  - lisinopril-hydrochlorothiazide (PRINZIDE;ZESTORETIC) 20-12.5 MG per tablet; Take 1 tablet by mouth daily  Dispense: 90 tablet;  Refill: 1      25 minutes spent with the pt face-to-face,  >50% spent reviewing test results and discussing plan of care and counseled on importance of weight loss  Follow-up with pulmonary if not improving

## 2019-07-11 ENCOUNTER — HOSPITAL ENCOUNTER (OUTPATIENT)
Age: 49
Discharge: HOME | End: 2019-07-11
Payer: COMMERCIAL

## 2019-07-11 ENCOUNTER — OFFICE VISIT (OUTPATIENT)
Dept: PULMONOLOGY | Age: 49
End: 2019-07-11
Payer: COMMERCIAL

## 2019-07-11 ENCOUNTER — HOSPITAL ENCOUNTER (OUTPATIENT)
Dept: GENERAL RADIOLOGY | Age: 49
Discharge: HOME | End: 2019-07-11
Payer: COMMERCIAL

## 2019-07-11 VITALS
HEART RATE: 94 BPM | RESPIRATION RATE: 24 BRPM | OXYGEN SATURATION: 96 % | DIASTOLIC BLOOD PRESSURE: 84 MMHG | SYSTOLIC BLOOD PRESSURE: 134 MMHG | BODY MASS INDEX: 53.78 KG/M2 | WEIGHT: 315 LBS | HEIGHT: 64 IN

## 2019-07-11 DIAGNOSIS — R05.3 CHRONIC COUGH: ICD-10-CM

## 2019-07-11 DIAGNOSIS — G47.33 OSA (OBSTRUCTIVE SLEEP APNEA): ICD-10-CM

## 2019-07-11 DIAGNOSIS — R05.3 CHRONIC COUGH: Primary | ICD-10-CM

## 2019-07-11 PROCEDURE — 71046 X-RAY EXAM CHEST 2 VIEWS: CPT

## 2019-07-11 PROCEDURE — 99214 OFFICE O/P EST MOD 30 MIN: CPT | Performed by: INTERNAL MEDICINE

## 2019-07-11 RX ORDER — FLUTICASONE FUROATE AND VILANTEROL 200; 25 UG/1; UG/1
1 POWDER RESPIRATORY (INHALATION) DAILY
Qty: 42 EACH | Refills: 0 | Status: SHIPPED | OUTPATIENT
Start: 2019-07-11 | End: 2019-08-22

## 2019-07-11 RX ORDER — GUAIFENESIN 600 MG/1
1200 TABLET, EXTENDED RELEASE ORAL
Qty: 60 TABLET | Refills: 0 | Status: SHIPPED | OUTPATIENT
Start: 2019-07-11 | End: 2019-08-10

## 2019-07-11 RX ORDER — METFORMIN HYDROCHLORIDE 500 MG/1
1 TABLET, EXTENDED RELEASE ORAL 2 TIMES DAILY
Refills: 3 | COMMUNITY
Start: 2019-07-05 | End: 2020-01-29

## 2019-07-11 RX ORDER — BENZONATATE 200 MG/1
200 CAPSULE ORAL 3 TIMES DAILY PRN
Qty: 90 CAPSULE | Refills: 1 | Status: SHIPPED | OUTPATIENT
Start: 2019-07-11 | End: 2019-08-10

## 2019-07-11 RX ORDER — GUAIFENESIN AND DEXTROMETHORPHAN HYDROBROMIDE 1200; 60 MG/1; MG/1
1 TABLET, EXTENDED RELEASE ORAL 2 TIMES DAILY
Qty: 60 TABLET | Refills: 1 | Status: SHIPPED | OUTPATIENT
Start: 2019-07-11 | End: 2020-01-29

## 2019-07-11 RX ORDER — MONTELUKAST SODIUM 10 MG/1
10 TABLET ORAL NIGHTLY
Qty: 30 TABLET | Refills: 1 | Status: SHIPPED | OUTPATIENT
Start: 2019-07-11 | End: 2019-09-10 | Stop reason: SDUPTHER

## 2019-07-11 RX ORDER — HYDROCHLOROTHIAZIDE 25 MG/1
25 TABLET ORAL EVERY MORNING
Qty: 30 TABLET | Refills: 5 | Status: SHIPPED | OUTPATIENT
Start: 2019-07-11 | End: 2019-12-23

## 2019-07-11 ASSESSMENT — SLEEP AND FATIGUE QUESTIONNAIRES
HOW LIKELY ARE YOU TO NOD OFF OR FALL ASLEEP WHEN YOU ARE A PASSENGER IN A CAR FOR AN HOUR WITHOUT A BREAK: 2
NECK CIRCUMFERENCE (INCHES): 21
HOW LIKELY ARE YOU TO NOD OFF OR FALL ASLEEP WHILE WATCHING TV: 1
HOW LIKELY ARE YOU TO NOD OFF OR FALL ASLEEP WHILE LYING DOWN TO REST IN THE AFTERNOON WHEN CIRCUMSTANCES PERMIT: 1
HOW LIKELY ARE YOU TO NOD OFF OR FALL ASLEEP WHILE SITTING QUIETLY AFTER LUNCH WITHOUT ALCOHOL: 0
HOW LIKELY ARE YOU TO NOD OFF OR FALL ASLEEP IN A CAR, WHILE STOPPED FOR A FEW MINUTES IN TRAFFIC: 0
HOW LIKELY ARE YOU TO NOD OFF OR FALL ASLEEP WHILE SITTING INACTIVE IN A PUBLIC PLACE: 0
ESS TOTAL SCORE: 4
HOW LIKELY ARE YOU TO NOD OFF OR FALL ASLEEP WHILE SITTING AND TALKING TO SOMEONE: 0
HOW LIKELY ARE YOU TO NOD OFF OR FALL ASLEEP WHILE SITTING AND READING: 0

## 2019-07-11 NOTE — PROGRESS NOTES
associated with shortness of breath especially in shower, waking cough, post tussive emesis, reactive in cold. Has Albuterol PRN which is somewhat helpful, especially for wheezing. Sleep:  Several year h/o severe snoring associated with few witnessed apneas, worse on back, no treatments tried so far. Was evaluated by Prabhu Ramirez    Subsequent: dyspnea improved over last 8 weeks, albuterol clearly helpful, sometimes wheezes  2 trials Dulera, both followed by febrile illness and worsened cough. Cough has persisted for 3 months, is productive, especially severe at night. Physical Exam:  Blood pressure 134/84, pulse 94, resp. rate 24, height 5' 4\" (1.626 m), weight (!) 399 lb (181 kg), SpO2 96 %.'371# in 2017, 392# in 2018, 399# in 2019  Constitutional:  No acute distress. HENT:  Oropharynx is clear and moist. Mallampati Class 3  Neck: . No tracheal deviation present. No obvious masses. Neck Circumference: 21  Pulmonary/Chest:   No accessory muscle usage or stridor. CTA and P but cough is persistent throughout exam  CV : RRR without MRG  Musculoskeletal:  No clubbing. Psychiatric: Normal mood and affect. Data:   ECHO  Mild concentric hypertrophy    PFT  FEV1 3.5 L 96% TLC 5.36 L 88% DLCO 26.3 89%    PSG 2013 with Corser #20 total apneas, many hypopneas, AHI of 68    CT CHEST 6/5/15     Findings:       Lung windows demonstrate that the central airways are patent. The   lungs are clear, without evidence of acute airspace consolidation. There is no evidence of a pneumothorax or pleural effusion. There   is a calcified granuloma within the right lower lobe. No   suspicious pulmonary nodule or mass is evident.       Mediastinal windows demonstrate no pathologic lymphadenopathy. The   heart, pericardium, and intrathoracic great vessels are   unremarkable in noncontrast appearance.  Incidental note is made of   bilateral gynecomastia.       Limited images of the upper abdomen demonstrate that the adrenal glands are normal. The remainder of the upper abdomen is   unremarkable. Bone windows demonstrate mild degenerative change   throughout the spine without evidence of an osteolytic or   osteoblastic lesion.           Impression   IMPRESSION:       1. No acute intrapulmonary findings.       2. Calcified right lower lobe granuloma, benign.       3. Bilateral gynecomastia. Assessment:  · Chronic cough, following influenza like illness, similar to prior episode in 2015  · Hypertension  · Severe Obstructive Sleep Apnea - AHI down to 2 from 68 with APAP    Not addressed today: extensive prior evaluation for cough    Plan:   · Start Mucinex DM extra strength HS (1200 mg of DM)  · Mucinex without DM during the day. · Try tessalon 200 mg PO tid PRN cough  · Change lisinopril HCTZ to HCTZ alone until cough dramatically better  · Breo one inhalation once daily  · Try hycodan for cough when severe, no driving, never with any other opiate or sed-hypnotic  · Give it 5 days, if cough not much better add Daliresp 500 mcg daily. · Continue with Daliresp but add in Singulair 10 mg daily   · If still not better after a total of 2 weeks, call Melissa Alberto and plan steroids  · PA LAT CXR today  · APAP 10 to 13   · No driving while sleepy; weight loss recommended; systemic benefits of CPAP therapy have been discussed.     · CPAP supplies          Patient MD Psychiatry

## 2019-07-29 ENCOUNTER — TELEPHONE (OUTPATIENT)
Dept: PULMONOLOGY | Age: 49
End: 2019-07-29

## 2019-07-29 NOTE — TELEPHONE ENCOUNTER
Visit Follow-Up Question     From  Frank Cadena To  [facility] [place] [facility] Support Sent  7/29/2019  2:57 PM   Hi   Finally starting to feel better.  On 2nd week of 500mg daliresp and one week of singular.  Not sure if it was the monteleukast or finally time with the daliresp but less coughing.  Off the syrup and tesalon and mucinex for 1 week.  Continuing the breo.   Just curious as to any change or how long to take meds. Melburn Estela are rough on stomach.  If you want me to come back in let me know.  I'm not well yet but a lot better in terms of productive cough.  Still a little wheezy and CORTEZ. Thanks   [facility]   [phone]       OV 7/11/19:    Assessment:  · Chronic cough, following influenza like illness, similar to prior episode in 2015  · Hypertension  · Severe Obstructive Sleep Apnea - AHI down to 2 from 68 with APAP     Not addressed today: extensive prior evaluation for cough     Plan:   · Start Mucinex DM extra strength HS (1200 mg of DM)  · Mucinex without DM during the day. · Try tessalon 200 mg PO tid PRN cough  · Change lisinopril HCTZ to HCTZ alone until cough dramatically better  · Breo one inhalation once daily  · Try hycodan for cough when severe, no driving, never with any other opiate or sed-hypnotic  · Give it 5 days, if cough not much better add Daliresp 500 mcg daily. · Continue with Daliresp but add in Singulair 10 mg daily   · If still not better after a total of 2 weeks, call Yuni Session and plan steroids  · PA LAT CXR today  · APAP 10 to 13   · No driving while sleepy; weight loss recommended; systemic benefits of CPAP therapy have been discussed.     · CPAP supplies            Patient MD Psychiatry

## 2019-09-10 RX ORDER — MONTELUKAST SODIUM 10 MG/1
10 TABLET ORAL NIGHTLY
Qty: 90 TABLET | Refills: 3 | Status: SHIPPED | OUTPATIENT
Start: 2019-09-10 | End: 2020-07-15

## 2019-12-23 RX ORDER — HYDROCHLOROTHIAZIDE 25 MG/1
25 TABLET ORAL EVERY MORNING
Qty: 30 TABLET | Refills: 0 | Status: SHIPPED | OUTPATIENT
Start: 2019-12-23 | End: 2020-01-29 | Stop reason: SDUPTHER

## 2020-01-28 NOTE — PROGRESS NOTES
Daniela Redd is a 54 y.o. male. HPI:  Here to discuss medication and refills, pressure check, had flu vaccine  Due for fasting labs    Bout of diverticulitis in the fall, proved with antibiotics  I am to work on weight loss through diet and exercise  Weight loss noted  Repeat blood pressure acceptable  Off lisinopril from his pulmonologist Leonor Oconnor he had a cough  Meds, vitamins and allergies reviewed with pt  Wt Readings from Last 3 Encounters:   01/29/20 (!) 375 lb (170.1 kg)   07/11/19 (!) 399 lb (181 kg)   06/05/19 (!) 403 lb (182.8 kg)       REVIEW OF SYSTEMS:   CONSTITUTIONAL: See history of present illness,   Weight loss  noted   HEENT: No new vision difficulties or ringing in the ears. RESPIRATORY: No new SOB, PND, orthopnea or cough. CARDIOVASCULAR: no CP, palpitations or SOB with exertion  GI: No nausea, vomiting, diarrhea, constipation, abdominal pain or changes in bowel habits. : No urinary frequency, urgency, incontinence hematuria or dysuria. SKIN: No cyanosis or skin lesions. MUSCULOSKELETAL: No new muscle or joint pain. Mild lower extremity edema  NEUROLOGICAL: No syncope or TIA-like symptoms. PSYCHIATRIC: No anxiety, insomnia or depression     Allergies   Allergen Reactions    Adhesive Tape Dermatitis     Causes skin to get red    Asa Buff (Mag [Buffered Aspirin]      Nose bleeds     Lisinopril Other (See Comments)     cough       Prior to Visit Medications    Medication Sig Taking? Authorizing Provider   ALPRAZolam Eloise Butler) 1 MG tablet Take 1 tablet by mouth nightly as needed for Sleep for up to 10 doses.  Yes Judge Maria Alejandra MD   hydrochlorothiazide (HYDRODIURIL) 25 MG tablet Take 1 tablet by mouth every morning Yes Judge Maria Alejandra MD   docusate sodium (COLACE) 250 MG capsule Take 1 capsule by mouth daily Yes Judge Maria Alejandra MD   montelukast (SINGULAIR) 10 MG tablet Take 1 tablet by mouth nightly Yes Dillan Magallanes MD   docusate sodium (COLACE) 100 MG capsule Take 1 capsule by Mixed hyperlipidemia  Recheck labs  - Lipid Panel; Future    3. Prediabetes  Healthy diet, continue exercise  - CBC Auto Differential; Future  - Comprehensive Metabolic Panel; Future  - Hemoglobin A1C; Future  - Lipid Panel; Future  - C-REACTIVE PROTEIN; Future    4. Medication management contract agreement  Screen, Xanax as needed only  - DRUG SCREEN MULTI URINE    5. Anxiety  Refill  - ALPRAZolam (XANAX) 1 MG tablet; Take 1 tablet by mouth nightly as needed for Sleep for up to 10 doses. Dispense: 10 tablet; Refill: 0    7. Pituitary microadenoma (HCC)  Screen  - TSH with Reflex; Future  - T4, Free; Future  - Vitamin D 25 Hydroxy; Future  - C-REACTIVE PROTEIN; Future    8. Urinary frequency  Screen  - PSA, Prostatic Specific Antigen; Future    9. Lower leg edema  Pression stockings  - Compression Stocking    10.  Diverticulitis  Noted      25 minutes spent with the pt face-to-face,  >50% spent reviewing test results and discussing plan of care and counseled on the diet, exercise, continue weight loss, continue water pill in the morning, fasting labs today, colonoscopy in the future

## 2020-01-29 ENCOUNTER — OFFICE VISIT (OUTPATIENT)
Dept: FAMILY MEDICINE CLINIC | Age: 50
End: 2020-01-29
Payer: COMMERCIAL

## 2020-01-29 VITALS
HEART RATE: 73 BPM | BODY MASS INDEX: 53.78 KG/M2 | WEIGHT: 315 LBS | SYSTOLIC BLOOD PRESSURE: 120 MMHG | DIASTOLIC BLOOD PRESSURE: 78 MMHG | OXYGEN SATURATION: 95 % | HEIGHT: 64 IN

## 2020-01-29 DIAGNOSIS — R73.03 PREDIABETES: ICD-10-CM

## 2020-01-29 DIAGNOSIS — R35.0 URINARY FREQUENCY: ICD-10-CM

## 2020-01-29 DIAGNOSIS — E78.2 MIXED HYPERLIPIDEMIA: ICD-10-CM

## 2020-01-29 DIAGNOSIS — D35.2 PITUITARY MICROADENOMA (HCC): ICD-10-CM

## 2020-01-29 DIAGNOSIS — I10 ESSENTIAL HYPERTENSION: ICD-10-CM

## 2020-01-29 PROBLEM — K57.92 DIVERTICULITIS: Status: ACTIVE | Noted: 2020-01-29

## 2020-01-29 LAB
A/G RATIO: 1.5 (ref 1.1–2.2)
ALBUMIN SERPL-MCNC: 3.8 G/DL (ref 3.4–5)
ALP BLD-CCNC: 65 U/L (ref 40–129)
ALT SERPL-CCNC: 16 U/L (ref 10–40)
AMPHETAMINE SCREEN, URINE: NORMAL
ANION GAP SERPL CALCULATED.3IONS-SCNC: 15 MMOL/L (ref 3–16)
AST SERPL-CCNC: 14 U/L (ref 15–37)
BARBITURATE SCREEN URINE: NORMAL
BASOPHILS ABSOLUTE: 0.1 K/UL (ref 0–0.2)
BASOPHILS RELATIVE PERCENT: 0.7 %
BENZODIAZEPINE SCREEN, URINE: NORMAL
BILIRUB SERPL-MCNC: 0.3 MG/DL (ref 0–1)
BUN BLDV-MCNC: 10 MG/DL (ref 7–20)
C-REACTIVE PROTEIN: 51.6 MG/L (ref 0–5.1)
CALCIUM SERPL-MCNC: 9.1 MG/DL (ref 8.3–10.6)
CANNABINOID SCREEN URINE: NORMAL
CHLORIDE BLD-SCNC: 97 MMOL/L (ref 99–110)
CHOLESTEROL, TOTAL: 189 MG/DL (ref 0–199)
CO2: 27 MMOL/L (ref 21–32)
COCAINE METABOLITE SCREEN URINE: NORMAL
CREAT SERPL-MCNC: 0.8 MG/DL (ref 0.9–1.3)
EOSINOPHILS ABSOLUTE: 0.2 K/UL (ref 0–0.6)
EOSINOPHILS RELATIVE PERCENT: 2.2 %
GFR AFRICAN AMERICAN: >60
GFR NON-AFRICAN AMERICAN: >60
GLOBULIN: 2.5 G/DL
GLUCOSE BLD-MCNC: 93 MG/DL (ref 70–99)
HCT VFR BLD CALC: 42 % (ref 40.5–52.5)
HDLC SERPL-MCNC: 36 MG/DL (ref 40–60)
HEMOGLOBIN: 14 G/DL (ref 13.5–17.5)
LDL CHOLESTEROL CALCULATED: 130 MG/DL
LYMPHOCYTES ABSOLUTE: 1.6 K/UL (ref 1–5.1)
LYMPHOCYTES RELATIVE PERCENT: 17.5 %
Lab: NORMAL
MCH RBC QN AUTO: 27.7 PG (ref 26–34)
MCHC RBC AUTO-ENTMCNC: 33.3 G/DL (ref 31–36)
MCV RBC AUTO: 83 FL (ref 80–100)
METHADONE SCREEN, URINE: NORMAL
MONOCYTES ABSOLUTE: 0.6 K/UL (ref 0–1.3)
MONOCYTES RELATIVE PERCENT: 7 %
NEUTROPHILS ABSOLUTE: 6.6 K/UL (ref 1.7–7.7)
NEUTROPHILS RELATIVE PERCENT: 72.6 %
OPIATE SCREEN URINE: NORMAL
OXYCODONE URINE: NORMAL
PDW BLD-RTO: 15.1 % (ref 12.4–15.4)
PH UA: 6
PHENCYCLIDINE SCREEN URINE: NORMAL
PLATELET # BLD: 273 K/UL (ref 135–450)
PMV BLD AUTO: 9.1 FL (ref 5–10.5)
POTASSIUM SERPL-SCNC: 3.8 MMOL/L (ref 3.5–5.1)
PROPOXYPHENE SCREEN: NORMAL
RBC # BLD: 5.06 M/UL (ref 4.2–5.9)
SODIUM BLD-SCNC: 139 MMOL/L (ref 136–145)
TOTAL PROTEIN: 6.3 G/DL (ref 6.4–8.2)
TRIGL SERPL-MCNC: 114 MG/DL (ref 0–150)
VLDLC SERPL CALC-MCNC: 23 MG/DL
WBC # BLD: 9.1 K/UL (ref 4–11)

## 2020-01-29 PROCEDURE — 99214 OFFICE O/P EST MOD 30 MIN: CPT | Performed by: FAMILY MEDICINE

## 2020-01-29 RX ORDER — DOCUSATE SODIUM 250 MG
250 CAPSULE ORAL DAILY
Qty: 90 CAPSULE | Refills: 3 | Status: SHIPPED | OUTPATIENT
Start: 2020-01-29 | End: 2020-09-25

## 2020-01-29 RX ORDER — AMLODIPINE BESYLATE 2.5 MG/1
2.5 TABLET ORAL NIGHTLY
Qty: 90 TABLET | Refills: 3 | Status: SHIPPED | OUTPATIENT
Start: 2020-01-29 | End: 2020-01-29 | Stop reason: CLARIF

## 2020-01-29 RX ORDER — DOCUSATE SODIUM 100 MG/1
100 CAPSULE, LIQUID FILLED ORAL 2 TIMES DAILY
Qty: 120 CAPSULE | Refills: 3 | Status: CANCELLED | OUTPATIENT
Start: 2020-01-29

## 2020-01-29 RX ORDER — HYDROCHLOROTHIAZIDE 25 MG/1
25 TABLET ORAL EVERY MORNING
Qty: 90 TABLET | Refills: 3 | Status: SHIPPED | OUTPATIENT
Start: 2020-01-29 | End: 2020-09-25 | Stop reason: SDUPTHER

## 2020-01-29 RX ORDER — ALPRAZOLAM 1 MG/1
1 TABLET ORAL NIGHTLY PRN
Qty: 10 TABLET | Refills: 0 | Status: SHIPPED | OUTPATIENT
Start: 2020-01-29 | End: 2021-03-15

## 2020-01-29 ASSESSMENT — PATIENT HEALTH QUESTIONNAIRE - PHQ9
SUM OF ALL RESPONSES TO PHQ QUESTIONS 1-9: 0
1. LITTLE INTEREST OR PLEASURE IN DOING THINGS: 0
SUM OF ALL RESPONSES TO PHQ9 QUESTIONS 1 & 2: 0
2. FEELING DOWN, DEPRESSED OR HOPELESS: 0
SUM OF ALL RESPONSES TO PHQ QUESTIONS 1-9: 0

## 2020-01-29 NOTE — LETTER
MEDICATION AGREEMENT     Juana Dyerman  [birth year]      For certain conditions, multiple classes of medications may be used to help better manage your symptoms, and to improve your ability to function at home, work and in social settings. However, these medications do have risks, which will be discussed with you, including addiction and dependency. The following prescribed medications need frequent monitoring and will require you to partner and assist in your healthcare. Medication  Dose, instructions and quantity as indicated on current prescription bottle Diagnosis/Reason(s) for Taking Category     Xanax          norco                       Benefits and goals of Controlled Substance Medications: There are two potential goals for your treatment: (1) decreased pain and suffering (2) improved daily life functions. There are many possible treatments for your chronic condition(s), and, in addition to controlled substance medications, we will try alternatives such as physical therapy, yoga, massage, home daily exercise, meditation, relaxation techniques, injections, chiropractic manipulations, surgery, cognitive therapy, hypnosis and many medications that are not habit-forming. Use of controlled substance medications may be helpful, but they are unlikely to resolve all of your symptoms or restore all function. Risks of Controlled Substance Medications:    Opioid pain medications: These medications can lead to problems such as addiction/dependence, sedation, lightheadedness/dizziness, memory issues, falls, constipation, nausea, or vomiting. They may also impair the ability to drive or operate machinery. Additionally, these medications may lower testosterone levels, leading to loss of bone strength, stamina and sex drive.   They may cause problems with breathing, sleep apnea and reduced coughing, which are especially dangerous for patients with lung which may also result in my being prevented from receiving further care from this office. · Other:____________________________________________________________________    AGREEMENT:    I have read the above and have had all of my questions answered. For chronic disease management, I know that my symptoms can be managed with many types of treatments. A chronic medication trial may be part of my treatment, but I must be an active participant in my care. Medication therapy is only one part of my symptom management plan. In some cases, there may be limited scientific evidence to support the chronic use of certain medications to improve symptoms and daily function. Furthermore, in certain circumstances, there may be scientific information that suggests that use of chronic controlled substances may actually worsen my symptoms and increase my risk of unintentional death directly related to this medication therapy. I know that if my provider feels my risk from controlled medications is greater than my benefit, I will have my controlled substance medication(s) compassionately lowered or removed altogether. I agree to a controlled substance medication trial.      I further agree to allow this office to contact my HIPAA contact on file if there are concerns about my safety and use of controlled medications. I have agreed to use the following medications above as instructed by my physician and as stated in this [id number].      Patient Signature:  ______________________  Date:1/29/2020 or _____________    Provider Signature:______________________  Date:1/29/2020 or _____________

## 2020-01-30 LAB
ESTIMATED AVERAGE GLUCOSE: 116.9 MG/DL
HBA1C MFR BLD: 5.7 %
PROSTATE SPECIFIC ANTIGEN: 0.68 NG/ML (ref 0–4)
T4 FREE: 1.1 NG/DL (ref 0.9–1.8)
TSH REFLEX: 1.98 UIU/ML (ref 0.27–4.2)
VITAMIN D 25-HYDROXY: 39.8 NG/ML

## 2020-07-06 RX ORDER — MONTELUKAST SODIUM 10 MG/1
10 TABLET ORAL NIGHTLY
Qty: 90 TABLET | Refills: 3 | OUTPATIENT
Start: 2020-07-06 | End: 2021-07-06

## 2020-07-15 ENCOUNTER — TELEPHONE (OUTPATIENT)
Dept: PULMONOLOGY | Age: 50
End: 2020-07-15

## 2020-07-15 ENCOUNTER — VIRTUAL VISIT (OUTPATIENT)
Dept: PULMONOLOGY | Age: 50
End: 2020-07-15
Payer: COMMERCIAL

## 2020-07-15 PROCEDURE — 99212 OFFICE O/P EST SF 10 MIN: CPT | Performed by: INTERNAL MEDICINE

## 2020-07-15 RX ORDER — MONTELUKAST SODIUM 10 MG/1
10 TABLET ORAL NIGHTLY
Qty: 90 TABLET | Refills: 3 | Status: SHIPPED | OUTPATIENT
Start: 2020-07-15 | End: 2020-09-25

## 2020-07-15 RX ORDER — ALPRAZOLAM 0.5 MG/1
0.5 TABLET ORAL NIGHTLY PRN
COMMUNITY
End: 2022-04-19 | Stop reason: SDUPTHER

## 2020-07-15 RX ORDER — ALBUTEROL SULFATE 90 UG/1
2 AEROSOL, METERED RESPIRATORY (INHALATION) EVERY 4 HOURS PRN
Qty: 1 INHALER | Refills: 3 | Status: SHIPPED | OUTPATIENT
Start: 2020-07-15 | End: 2020-09-25

## 2020-07-15 ASSESSMENT — SLEEP AND FATIGUE QUESTIONNAIRES
HOW LIKELY ARE YOU TO NOD OFF OR FALL ASLEEP WHILE SITTING AND TALKING TO SOMEONE: 0
HOW LIKELY ARE YOU TO NOD OFF OR FALL ASLEEP WHILE SITTING INACTIVE IN A PUBLIC PLACE: 0
ESS TOTAL SCORE: 4
HOW LIKELY ARE YOU TO NOD OFF OR FALL ASLEEP IN A CAR, WHILE STOPPED FOR A FEW MINUTES IN TRAFFIC: 0
HOW LIKELY ARE YOU TO NOD OFF OR FALL ASLEEP WHILE SITTING QUIETLY AFTER LUNCH WITHOUT ALCOHOL: 0
HOW LIKELY ARE YOU TO NOD OFF OR FALL ASLEEP WHILE LYING DOWN TO REST IN THE AFTERNOON WHEN CIRCUMSTANCES PERMIT: 2
HOW LIKELY ARE YOU TO NOD OFF OR FALL ASLEEP WHEN YOU ARE A PASSENGER IN A CAR FOR AN HOUR WITHOUT A BREAK: 1
HOW LIKELY ARE YOU TO NOD OFF OR FALL ASLEEP WHILE WATCHING TV: 1
HOW LIKELY ARE YOU TO NOD OFF OR FALL ASLEEP WHILE SITTING AND READING: 0

## 2020-07-15 NOTE — PROGRESS NOTES
Chief Complaint: dyspnea    Referring Provider: Meghann Casey is a 52 y.o. male evaluated via telephone on 7/15/2020. Consent: He and/or health care decision maker is aware that that he may receive a bill for this telephone service, depending on his insurance coverage, and has provided verbal consent to proceed: YES  I communicated with the patient and/or health care decision maker about: see interval history below. Details of this discussion including any medical advice provided: see plan below  I affirm this is a Patient Initiated Episode with a Patient who has not had a related appointment within my department in the past 7 days or scheduled within the next 24 hours. Patient identification was verified at the start of the visit: YES  Total Time: minutes: 5-10 minutes     INTERVAL HPI:   Cough better, essentially gone, on Singulair. Going to try off. CHESTER treated with benefit with [id number]. PAP use is 100% by report with AHI 1-2. [place] is 4    PRESENTING HPI: Respiratory illness several years ago, followed by feeling like he does not get adequate lung volumes, associated with shortness of breath especially in shower, waking cough, post tussive emesis, reactive in cold. Has Albuterol PRN which is somewhat helpful, especially for wheezing. Sleep:  Several year h/o severe snoring associated with few witnessed apneas, worse on back, no treatments tried so far. Was evaluated by Chaitanya Sweet    Subsequent: dyspnea improved over last 8 weeks, albuterol clearly helpful, sometimes wheezes  2 trials Dulera, both followed by febrile illness and worsened cough. Cough has persisted for 3 months, is productive, especially severe at night.     Physical Exam:  There were no vitals taken for this visit.'371# in 2017, 392# in 2018, 399# in 2019  phone      Data:   ECHO  Mild concentric hypertrophy    PFT  FEV1 3.5 L 96% TLC 5.36 L 88% DLCO 26.3 89%    PSG 2013 with Corser #20 total apneas, many hypopneas, AHI of 68    CT CHEST 6/5/15     Findings:       Lung windows demonstrate that the central airways are patent. The   lungs are clear, without evidence of acute airspace consolidation. There is no evidence of a pneumothorax or pleural effusion. There   is a calcified granuloma within the right lower lobe. No   suspicious pulmonary nodule or mass is evident.       Mediastinal windows demonstrate no pathologic lymphadenopathy. The   heart, pericardium, and intrathoracic great vessels are   unremarkable in noncontrast appearance. Incidental note is made of   bilateral gynecomastia.       Limited images of the upper abdomen demonstrate that the adrenal   glands are normal. The remainder of the upper abdomen is   unremarkable. Bone windows demonstrate mild degenerative change   throughout the spine without evidence of an osteolytic or   osteoblastic lesion.           Impression   IMPRESSION:       1. No acute intrapulmonary findings.       2. Calcified right lower lobe granuloma, benign.       3. Bilateral gynecomastia. Assessment:  · Chronic cough, cough variant asthma  · Severe Obstructive Sleep Apnea - AHI down to 2 from 68 with APAP    Plan:   · Singulair is used during periods of asthma activity, ICS/prednisone only with exacerbation  · APAP 10 to 13   · No driving while sleepy; weight loss recommended; systemic benefits of CPAP therapy have been discussed. · CPAP supplies with [employer], send rx  · F/U in one year.       Patient is MD Psychiatry

## 2020-07-15 NOTE — TELEPHONE ENCOUNTER

## 2020-09-24 NOTE — PROGRESS NOTES
[facility] Medicine Preoperative Evaluation       Tenisha Howell M.D.     [address] [facility], [address] [place] [address] Road     (phone) [phone]       (fax) [phone]    Dear Dr. Eben Reyes,           Thank you for referring Twila Baldwin to me for Preoperative Evaluation prior to EGD and colonoscopy . Below are the relevant portions of my assessment and plan of care. Nilsa Rocha    52 y.o.   1970    [address]  [facility] [address]    Vitals:    09/25/20 1327   BP: 124/80   Pulse: 93   Temp: 98.8 °F (37.1 °C)   SpO2: 98%   Weight: (!) 365 lb (165.6 kg)   Height: 5' 4\" (1.626 m)      Wt Readings from Last 2 Encounters:   09/25/20 (!) 365 lb (165.6 kg)   01/29/20 (!) 375 lb (170.1 kg)     BP Readings from Last 3 Encounters:   09/25/20 124/80   01/29/20 120/78   07/11/19 134/84        Allergies   Allergen Reactions    Adhesive Tape Dermatitis     Causes skin to get red    Asa Buff (Mag [Buffered Aspirin]      Nose bleeds     Lisinopril Other (See Comments)     cough     Current Outpatient Medications   Medication Sig Dispense Refill    hydroCHLOROthiazide (HYDRODIURIL) 25 MG tablet Take 1 tablet by mouth every morning 90 tablet 3    ALPRAZolam (XANAX) 0.5 MG tablet Take 0.5 mg by mouth nightly as needed.  docusate sodium (COLACE) 100 MG capsule Take 1 capsule by mouth 2 times daily 120 capsule 3    Levothyroxine Sodium 75 MCG CAPS       Cholecalciferol (VITAMIN D3) 59486 units CAPS Take by mouth once a week      PROAIR  (90 Base) MCG/ACT inhaler INHALE TWO PUFFS INTO THE LUNGS EVERY 4 HOURS AS NEEDED FOR WHEEZING 8.5 Inhaler 1    clobetasol (TEMOVATE) 0.05 % ointment Apply sparingly to affected area(s) bid prn for flares, up to 2 weeks at a time. Do not apply on cleared skin. 60 g 0     No current facility-administered medications for this visit.         He presents to the office today for a preoperative consultation at the request of surgeon, Dr. Ruth Padilla  who plans on performing colonoscopy+ EGD on . The current problem began 2 years ago and is unchanged. Planned anesthesia is Mac. The patient has no known anesthesia issues. Patient has no bleeding risk. 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    Sleep apnea     uses c-pap     Past Surgical History:   Procedure Laterality Date    BRAIN SURGERY      COLONOSCOPY      MOUTH SURGERY      gum transplant    PITUITARY SURGERY  2016    WISDOM TOOTH EXTRACTION       Family History is not significant for reactions to anesthesia  Social History     Socioeconomic History    Marital status:      Spouse name: Vernell Goldmann Number of children: 0    Years of education: Not on file    Highest education level: Not on file   Occupational History    Occupation: psychiatrist   Social Needs    Financial resource strain: Not on file    Food insecurity     Worry: Not on file     Inability: Not on file   [employer] needs     Medical: Not on file     Non-medical: Not on file   Tobacco Use    Smoking status: Former Smoker     Packs/day: 1.00     Years: 3.00     Pack years: 3.00     Types: Cigarettes     Last attempt to quit: 1990     Years since quittin.6    Smokeless tobacco: Never Used   Substance and Sexual Activity    Alcohol use:  Yes     Alcohol/week: 0.0 standard drinks     Comment: social    Drug use: No    Sexual activity: Yes     Partners: Female   Lifestyle    Physical activity     Days per week: Not on file     Minutes per session: Not on file    Stress: Not on file   Relationships    Social connections     Talks on phone: Not on file     Gets together: Not on file     Attends Jain service: Not on file Active member of club or organization: Not on file     Attends meetings of clubs or organizations: Not on file     Relationship status: Not on file    Intimate partner violence     Fear of current or ex partner: Not on file     Emotionally abused: Not on file     Physically abused: Not on file     Forced sexual activity: Not on file   Other Topics Concern    Not on file   Social History Narrative    Not on file       REVIEW OF SYSTEMS:   CONSTITUTIONAL: no  fatigue, weakness, night sweats or fever. Weight loss noted  HEENT: No new vision difficulties or ringing in the ears. RESPIRATORY: No new SOB, PND, orthopnea or cough. Uses CPAP  CARDIOVASCULAR: no CP, palpitations or SOB with exertion  GI: No nausea, vomiting, diarrhea, constipation, abdominal pain or changes in bowel habits. : No urinary frequency, urgency, incontinence hematuria or dysuria. SKIN: No cyanosis or skin lesions. MUSCULOSKELETAL: No new muscle or joint pain. NEUROLOGICAL: No syncope or TIA-like symptoms. PSYCHIATRIC: No anxiety, insomnia or depression     Physical Exam   /80   Pulse 93   Temp 98.8 °F (37.1 °C)   Ht 5' 4\" (1.626 m)   Wt (!) 365 lb (165.6 kg)   SpO2 98%   BMI 62.65 kg/m²   Constitutional: Patient is oriented to person, place, and time. He appears in no distress. Head: Normocephalic and atraumatic. Right Ear: Tympanic membrane, external ear and ear canal normal.   Left Ear: Tympanic membrane, external ear and ear canal normal.   Nose: Nose normal.   Mouth/Throat: Not examined due to COVID   there is no cervical adenopathy. There are no loose teeth. Eyes: Conjunctivae and extraocular motions are normal. Pupils are equal, round, and reactive to light bilaterally. Neck: Neck supple. No JVD present. No mass and no thyromegaly present. Cardiovascular: Normal rate, regular rhythm, normal heart sounds and intact distal pulses. Exam reveals no gallop and no friction rub.   No murmur heard.  Pulmonary/Chest: Effort normal and breath sounds normal. No respiratory distress. There are no wheezes, rhonchi or rales. Abdominal: Soft, non-tender, obese, normal bowel sounds and aorta. There is no organomegaly, bruit or palpable mass appreciated  Neurological: He is alert and oriented to person, place, and time. Coordination normal.   Skin: Skin is warm and dry. There is no rash or erythema. No suspicious lesions noted. Psychiatric: He has a normal mood and affect. Speech and behavior are normal. Judgment, cognition and memory are normal.     EKG Interpretation: Not performed    Labs from  reviewed in care everywhere/acceptable  Lipid panel pending  Discussed future CT calcium score     Assessment:  Encounter Diagnoses   Name Primary?  Preop examination Yes    Colon cancer screening     Elevated C-reactive protein (CRP)     Essential hypertension     CHESTER on CPAP     Elevated LDL cholesterol level     Prediabetes         52 y.o. patient with planned surgery as above. Known risk factors for perioperative complications: Patient uses CPAP for obstructive sleep apnea, hypertension stable      Plan:    1. Preoperative workup as follows: none. 2. Change in medication regimen before surgery: discontinue NSAIDs (asa+ fish oil) 7d before surgery. 3. Prophylaxis for cardiac events with perioperative beta-blockers: not indicated. 4. Invasive hemodynamic monitoring perioperatively: not indicated. 5. Patient is cleared for upcoming EGD and colonoscopy  6. Pneumonia 23 vaccine given today        If you have questions, please do not hesitate to call me. Sincerely,        Lennox Cancel.  Jameson Hickman M.D.

## 2020-09-25 ENCOUNTER — OFFICE VISIT (OUTPATIENT)
Dept: FAMILY MEDICINE CLINIC | Age: 50
End: 2020-09-25
Payer: COMMERCIAL

## 2020-09-25 VITALS
TEMPERATURE: 98.8 F | DIASTOLIC BLOOD PRESSURE: 80 MMHG | HEART RATE: 93 BPM | OXYGEN SATURATION: 98 % | WEIGHT: 315 LBS | SYSTOLIC BLOOD PRESSURE: 124 MMHG | BODY MASS INDEX: 53.78 KG/M2 | HEIGHT: 64 IN

## 2020-09-25 PROCEDURE — 90471 IMMUNIZATION ADMIN: CPT | Performed by: FAMILY MEDICINE

## 2020-09-25 PROCEDURE — 99214 OFFICE O/P EST MOD 30 MIN: CPT | Performed by: FAMILY MEDICINE

## 2020-09-25 PROCEDURE — 90732 PPSV23 VACC 2 YRS+ SUBQ/IM: CPT | Performed by: FAMILY MEDICINE

## 2020-09-25 RX ORDER — HYDROCHLOROTHIAZIDE 25 MG/1
25 TABLET ORAL EVERY MORNING
Qty: 90 TABLET | Refills: 3 | Status: SHIPPED | OUTPATIENT
Start: 2020-09-25 | End: 2021-08-20

## 2021-02-18 NOTE — PROGRESS NOTES
-Reviewed sun protective behavior -- sun avoidance during the peak hours of the day, sun-protective clothing (including hat and sunglasses), sunscreen use (water resistant, broad spectrum, SPF at least 30, need for reapplication every 2 to 3 hours), avoidance of tanning beds   -Return for full skin exam in 1 year (sooner if indicated)     1a. Neoplasm of uncertain behavior of skin - R/o dysplastic nevus, midline mid back   -Discussed possible diagnosis. Patient agreeable to biopsy. Verbal consent obtained after risks (infection, bleeding, scar), benefits and alternatives explained. -Area(s) to be biopsied were marked with a surgical pen. Site(s) were cleansed with alcohol. Local anesthesia achieved with 1% lidocaine with epinephrine/sodium bicarbonate. Shave biopsy performed with a razor blade. Hemostasis was achieved with aluminum chloride. The wound(s) were dressed with petrolatum and covered with a bandage. Specimen(s) sent to pathology. Pt educated re: risk of bleeding, infection, scar and wound care instructions. 2. Chronic lichenified dermatitis, ankles - clear today   -Pt was educated re: chronicity, use of topical steroids for flares, importance of dry skin care  -Cont clobetasol ointment bid prn. Edu re: sparing use, atrophy, striae, hypopigmentation, telangiectasias.   -Dry skin care reviewed -- limit to daily showers, avoid hot water, mild soap (eg. Dove) to limited areas (axillae, groin), moisturize at least qd (thicker better)     3. Skin tag(s), irritated  -2 lesion(s) treated w/ liquid nitrogen x 2 cycles - R axilla 1, posterior neck base 1 . Edu re: risk of blister formation, discomfort, scar, hypopigmentation. Discussed wound care. 4. Inflamed seborrheic keratosis(es)  -Reassurance re: benignity  -1 lesion(s) treated w/ liquid nitrogen x 2 cycles - R areola. Edu re: risk of blister formation, discomfort, scar, hypopigmentation. Discussed wound care.

## 2021-02-18 NOTE — PATIENT INSTRUCTIONS
? Apply Vaseline or petroleum jelly to the wound using a cotton tipped applicator. ? Cover with a clean bandage. ? Repeat this process until the biopsy site is healed. ? If you had stitches placed, continue treating the site until the stitches are removed. Remember to make an appointment to return to have your stitches removed by our staff. ? You may shower and bathe as usual.       ** Biopsy results generally take around 7 business days to come back. If you have not heard from us by then, please call the office at [phone] between 8AM and 4PM Monday through Friday. Cryosurgery (Freezing) Wound Care Instructions    AFTER THE PROCEDURE:   ? You will notice swelling and redness around the site. This is normal.   ? You may experience a sharp or sore feeling for the next several days. For this discomfort, you may take acetaminophen (Tylenol©). ? A blister may develop at the treated area, sometimes as soon as by the end of the day. After several days, the blister will subside and a scab will form. ? If the area is bumped or traumatized during the first few days following freezing, you may develop bleeding into the blister, forming a blood blister. This is nothing to be alarmed about. ? If the blister is tense, uncomfortable, or much larger than the site that was frozen, you may pop the blister along its edge with a sterile needle (boiled, heated under a flame, or cleaned with alcohol) to allow the fluid to drain out. If the blister does not bother you, no treatment is needed. ? Do NOT peel off the top of the blister roof. It will act as a dressing on top of your wound. WOUND CARE:   ? You may shower or bathe as usual, but avoid scrubbing the areas that have been frozen. ? Cleanse the site twice a day with mild soapy water, and then apply a thin film of white petrolatum (Vaseline©). ? You do not need to cover the area, but can if you prefer. ? Do NOT allow the site to become dry or crusted, or attempt to dry it out with rubbing alcohol or hydrogen peroxide. ? Continue this regimen until the area is pink and healed. Depending on the size and location of your cryosurgery site, healing may take 2 to 4 weeks. ? The area may continue to be pink for several weeks, and over the next few months may become darker or lighter than the surrounding skin. This may be a permanent change.

## 2021-02-23 ENCOUNTER — OFFICE VISIT (OUTPATIENT)
Dept: DERMATOLOGY | Age: 51
End: 2021-02-23
Payer: COMMERCIAL

## 2021-02-23 VITALS — TEMPERATURE: 98.3 F

## 2021-02-23 DIAGNOSIS — L82.0 INFLAMED SEBORRHEIC KERATOSIS: ICD-10-CM

## 2021-02-23 DIAGNOSIS — D48.5 NEOPLASM OF UNCERTAIN BEHAVIOR OF SKIN: ICD-10-CM

## 2021-02-23 DIAGNOSIS — R20.9 DISTURBANCE OF SKIN SENSATION: ICD-10-CM

## 2021-02-23 DIAGNOSIS — Z12.83 SCREENING EXAM FOR SKIN CANCER: ICD-10-CM

## 2021-02-23 DIAGNOSIS — L91.8 SKIN TAG: ICD-10-CM

## 2021-02-23 DIAGNOSIS — D22.9 MULTIPLE BENIGN NEVI: Primary | ICD-10-CM

## 2021-02-23 PROCEDURE — 11102 TANGNTL BX SKIN SINGLE LES: CPT | Performed by: DERMATOLOGY

## 2021-02-23 PROCEDURE — 17110 DESTRUCTION B9 LES UP TO 14: CPT | Performed by: DERMATOLOGY

## 2021-02-23 PROCEDURE — 11200 RMVL SKIN TAGS UP TO&INC 15: CPT | Performed by: DERMATOLOGY

## 2021-02-23 PROCEDURE — 99213 OFFICE O/P EST LOW 20 MIN: CPT | Performed by: DERMATOLOGY

## 2021-02-25 LAB — DERMATOLOGY PATHOLOGY REPORT: NORMAL

## 2021-03-13 DIAGNOSIS — F41.9 ANXIETY: ICD-10-CM

## 2021-03-15 RX ORDER — ALPRAZOLAM 1 MG/1
TABLET ORAL
Qty: 10 TABLET | Refills: 0 | Status: SHIPPED | OUTPATIENT
Start: 2021-03-15 | End: 2021-04-15

## 2021-03-15 NOTE — TELEPHONE ENCOUNTER
Medication:   Requested Prescriptions     Pending Prescriptions Disp Refills    ALPRAZolam (XANAX) 1 MG tablet [Pharmacy Med Name: ALPRAZOLAM 1MG TABLETS] 10 tablet      Sig: TAKE 1 TABLET BY MOUTH EVERY NIGHT FOR UP TO 10 DOSES AS NEEDED FOR SLEEP        Last Filled:  05/29/2020 #10 0rf    Patient Phone Number: [phone] (home)     Last appt: 9/25/2020   Next appt: Visit date not found    Last OARRS: No flowsheet data found.
Dr. SISI Cade

## 2021-04-29 LAB
AVERAGE GLUCOSE: NORMAL
HBA1C MFR BLD: 6 %

## 2021-06-15 ENCOUNTER — TELEPHONE (OUTPATIENT)
Dept: PULMONOLOGY | Age: 51
End: 2021-06-15

## 2021-06-15 DIAGNOSIS — Z99.89 OSA ON CPAP: Primary | ICD-10-CM

## 2021-06-15 DIAGNOSIS — G47.33 OSA ON CPAP: Primary | ICD-10-CM

## 2021-06-15 NOTE — TELEPHONE ENCOUNTER
I put through order. I called and personally discussed with patient the risk of using current machine vs risk of not treating severe CHESTER.

## 2021-06-28 ENCOUNTER — TELEPHONE (OUTPATIENT)
Dept: FAMILY MEDICINE CLINIC | Age: 51
End: 2021-06-28

## 2021-07-13 ENCOUNTER — OFFICE VISIT (OUTPATIENT)
Dept: PULMONOLOGY | Age: 51
End: 2021-07-13
Payer: COMMERCIAL

## 2021-07-13 VITALS
HEART RATE: 83 BPM | SYSTOLIC BLOOD PRESSURE: 128 MMHG | WEIGHT: 315 LBS | TEMPERATURE: 96 F | OXYGEN SATURATION: 97 % | BODY MASS INDEX: 53.78 KG/M2 | DIASTOLIC BLOOD PRESSURE: 80 MMHG | HEIGHT: 64 IN

## 2021-07-13 DIAGNOSIS — G47.33 OSA (OBSTRUCTIVE SLEEP APNEA): Primary | ICD-10-CM

## 2021-07-13 PROCEDURE — 99213 OFFICE O/P EST LOW 20 MIN: CPT | Performed by: INTERNAL MEDICINE

## 2021-07-13 RX ORDER — TRAMADOL HYDROCHLORIDE 50 MG/1
50 TABLET ORAL EVERY 6 HOURS PRN
COMMUNITY
Start: 2021-03-11

## 2021-07-13 RX ORDER — CYCLOBENZAPRINE HCL 5 MG
5 TABLET ORAL DAILY PRN
COMMUNITY
Start: 2021-04-01

## 2021-07-13 ASSESSMENT — SLEEP AND FATIGUE QUESTIONNAIRES
HOW LIKELY ARE YOU TO NOD OFF OR FALL ASLEEP WHILE LYING DOWN TO REST IN THE AFTERNOON WHEN CIRCUMSTANCES PERMIT: 2
HOW LIKELY ARE YOU TO NOD OFF OR FALL ASLEEP WHILE SITTING AND TALKING TO SOMEONE: 0
HOW LIKELY ARE YOU TO NOD OFF OR FALL ASLEEP WHILE SITTING QUIETLY AFTER LUNCH WITHOUT ALCOHOL: 0
HOW LIKELY ARE YOU TO NOD OFF OR FALL ASLEEP WHEN YOU ARE A PASSENGER IN A CAR FOR AN HOUR WITHOUT A BREAK: 1
HOW LIKELY ARE YOU TO NOD OFF OR FALL ASLEEP IN A CAR, WHILE STOPPED FOR A FEW MINUTES IN TRAFFIC: 0
NECK CIRCUMFERENCE (INCHES): 21.5
HOW LIKELY ARE YOU TO NOD OFF OR FALL ASLEEP WHILE WATCHING TV: 0
HOW LIKELY ARE YOU TO NOD OFF OR FALL ASLEEP WHILE SITTING INACTIVE IN A PUBLIC PLACE: 0
ESS TOTAL SCORE: 3
HOW LIKELY ARE YOU TO NOD OFF OR FALL ASLEEP WHILE SITTING AND READING: 0

## 2021-07-13 NOTE — PROGRESS NOTES
Chief Complaint: dyspnea    Referring Provider: Humberto Smyth HPI:   No problems with cough, uses albuterol PRN only     CHESTER treated with benefit with [id number]. PAP use is 100%, AHI is 1.5, 95% pressure is 12.9, average use is 7:48. Doing well. New RESMED machine, purchased outright. [place] is 3    PRESENTING HPI: Respiratory illness several years ago, followed by feeling like he does not get adequate lung volumes, associated with shortness of breath especially in shower, waking cough, post tussive emesis, reactive in cold. Has Albuterol PRN which is somewhat helpful, especially for wheezing. Sleep:  Several year h/o severe snoring associated with few witnessed apneas, worse on back, no treatments tried so far. Was evaluated by Kacie Lowe    Subsequent: dyspnea improved over last 8 weeks, albuterol clearly helpful, sometimes wheezes  2 trials Dulera, both followed by febrile illness and worsened cough. Cough has persisted for 3 months, is productive, especially severe at night. Physical Exam:  Blood pressure 128/80, pulse 83, temperature 96 °F (35.6 °C), height 5' 4\" (1.626 m), weight (!) 365 lb (165.6 kg), SpO2 97 %.'371# in 2017, 392# in 2018, 399# in 2019  Constitutional:  No acute distress. HENT:  Oropharynx is clear and moist.  Mallampati 2  Neck: No tracheal deviation present. 21.5  Cardiovascular: Normal heart sounds. No lower extremity edema. Pulmonary/Chest: No wheezes. No rhonchi. No rales. No decreased breath sounds. No accessory muscle usage or stridor. Musculoskeletal: No cyanosis. No clubbing. Skin: Skin is warm and dry. Psychiatric: Normal mood and affect.   Neurologic: speech fluent, alert and oriented, strength symmetric      Data:   ECHO  Mild concentric hypertrophy    Jan 2021 Cardiac CT with no CAD    PFT  FEV1 3.5 L 96% TLC 5.36 L 88% DLCO 26.3 89%    PSG 2013 with Corser #20 total apneas, many hypopneas, AHI of 68    CT CHEST 6/5/15     Findings:       Lung windows demonstrate that the central airways are patent. The   lungs are clear, without evidence of acute airspace consolidation. There is no evidence of a pneumothorax or pleural effusion. There   is a calcified granuloma within the right lower lobe. No   suspicious pulmonary nodule or mass is evident.       Mediastinal windows demonstrate no pathologic lymphadenopathy. The   heart, pericardium, and intrathoracic great vessels are   unremarkable in noncontrast appearance. Incidental note is made of   bilateral gynecomastia.       Limited images of the upper abdomen demonstrate that the adrenal   glands are normal. The remainder of the upper abdomen is   unremarkable. Bone windows demonstrate mild degenerative change   throughout the spine without evidence of an osteolytic or   osteoblastic lesion.           Impression   IMPRESSION:       1. No acute intrapulmonary findings.       2. Calcified right lower lobe granuloma, benign.       3. Bilateral gynecomastia. Assessment:  · Chronic cough, cough variant asthma  · Severe Obstructive Sleep Apnea - AHI down to 2 from 68 with APAP    Plan:   · Albuterol PRN  · APAP 10 to 14, I adjusted today   · No driving while sleepy; weight loss recommended; systemic benefits of CPAP therapy have been discussed. · CPAP supplies with [employer]   · F/U in one year.       Patient is MD Psychiatry

## 2021-08-11 ENCOUNTER — TELEPHONE (OUTPATIENT)
Dept: FAMILY MEDICINE CLINIC | Age: 51
End: 2021-08-11

## 2021-08-11 NOTE — TELEPHONE ENCOUNTER
Ailyn from [address] [place] [address]. Allergy office requested immunization records for the patient faxed to [phone]

## 2021-08-12 ENCOUNTER — TELEPHONE (OUTPATIENT)
Dept: FAMILY MEDICINE CLINIC | Age: 51
End: 2021-08-12

## 2021-08-12 NOTE — TELEPHONE ENCOUNTER
MATHEW MUNOZ [facility] pharmacist and then called patient    Will discuss  with his allergist and get back with Dr. Hien Auguste    Close this call

## 2021-08-12 NOTE — TELEPHONE ENCOUNTER
Dr Eleanor Ramires said pt has had the [facility] vaccine (2), but not responsive. This pt is considered high risk. He was asking if we did the antibody infusions here? He was asking about the process if the pt had a positive diagnosis  Dr Eleanor Ramires didn't need a callback - he said to discuss with the pt.

## 2021-08-20 RX ORDER — HYDROCHLOROTHIAZIDE 25 MG/1
25 TABLET ORAL EVERY MORNING
Qty: 90 TABLET | Refills: 3 | Status: SHIPPED | OUTPATIENT
Start: 2021-08-20 | End: 2022-04-19 | Stop reason: SDUPTHER

## 2021-10-31 NOTE — PROGRESS NOTES
Yes Historical Provider, MD   ALPRAZolam Ros Balboa) 0.5 MG tablet Take 0.5 mg by mouth nightly as needed. Yes Historical Provider, MD   docusate sodium (COLACE) 100 MG capsule Take 1 capsule by mouth 2 times daily Yes Yari Connors MD   Levothyroxine Sodium 75 MCG CAPS  Yes Historical Provider, MD   Cholecalciferol (VITAMIN D3) 93374 units CAPS Take by mouth once a week Yes Historical Provider, MD   PROAIR  (90 Base) MCG/ACT inhaler INHALE TWO PUFFS INTO THE LUNGS EVERY 4 HOURS AS NEEDED FOR WHEEZING Yes Yari Connors MD   clobetasol (TEMOVATE) 0.05 % ointment Apply sparingly to affected area(s) bid prn for flares, up to 2 weeks at a time. Do not apply on cleared skin. Yes Aria Leiva MD       Past Medical History:   Diagnosis Date    Anxiety     Asthma     Claustrophobia     Dyspnea 2015    Elevated BP     Hypertension     Hypogonadal obesity 2010    Persistent headaches     right temporal-started 3 weeks ago    Pituitary macroadenoma ([address] 2016    Sleep apnea     uses c-pap       Social History     Tobacco Use    Smoking status: Former Smoker     Packs/day: 1.00     Years: 3.00     Pack years: 3.00     Types: Cigarettes     Quit date: 1990     Years since quittin.7    Smokeless tobacco: Never Used   Substance Use Topics    Alcohol use: Yes     Alcohol/week: 0.0 standard drinks     Comment: social       Family History   Problem Relation Age of Onset    Breast Cancer Mother     Thyroid Disease Mother     Heart Disease Maternal Grandfather     Heart Attack Maternal Grandfather        OBJECTIVE:  /76   Pulse 94   Temp 97 °F (36.1 °C)   Wt (!) 351 lb 6.4 oz (159.4 kg)   SpO2 97%   BMI 60.32 kg/m²   GEN:  in NAD, BMI noted, working on weight  HEENT:  NCAT, TMs:normal and throat: Not examined due to Covid/mask  NECK:  Supple without adenopathy.   CV:  Regular rate and rhythm, S1 and S2 normal, no murmurs, clicks  PULM:  Chest is clear, no wheezing ,  symmetric air entry throughout both lung fields. ABD: Soft, NT, obese, no masses  EXT: No rash or edema  NEURO: Alert oriented ×3, nonfocal, no assistive device    ASSESSMENT/PLAN:  1. Essential hypertension  Stable continue medication    2. CHESTER on CPAP  Stable continue CPAP  3. Prediabetes  Healthy diet and exercise  Recheck lab  - Hemoglobin A1C; Future  - Lipid Panel; Future  - Comprehensive Metabolic Panel; Future    4. Mixed hyperlipidemia  Screen  - Lipid Panel; Future    5. BMI 60.0-69.9, adult ([facility]  Working on weight loss    6. Urinary frequency    7. Hypogonadal obesity  Screen  - PSA screening; Future  - TSH with Reflex; Future  - SEDIMENTATION RATE; Future  - C-REACTIVE PROTEIN; Future  - Vitamin D 25 Hydroxy; Future  - Testosterone, free, total; Future    8. Encounter for health-related screening  Screen  - Quantiferon, Incubated; Future    9. Antibody deficiency syndrome ([facility]  Seeing specialist    10. Ankylosing spondylitis of multiple sites in spine ([facility]  Pain specialist    11.  Partial nontraumatic tear of both rotator cuffs  Seeing rehab       30 Total Minutes spent pre charting (reviewing problem list, meds, any test results, consultant and hospital notes, health maintenance reviewed) and  obtaining present visit history, performing appropriate medical exam/evaluation, counseling and educating the patient (and family), ordering medications ,tests, and procedures as needed, refilling medication(s), placing referral(s) when needed in addition to coordinating care for this patient and documenting in electronic health record 123

## 2021-11-01 ENCOUNTER — OFFICE VISIT (OUTPATIENT)
Dept: FAMILY MEDICINE CLINIC | Age: 51
End: 2021-11-01
Payer: COMMERCIAL

## 2021-11-01 VITALS
SYSTOLIC BLOOD PRESSURE: 113 MMHG | HEART RATE: 94 BPM | BODY MASS INDEX: 60.32 KG/M2 | DIASTOLIC BLOOD PRESSURE: 76 MMHG | TEMPERATURE: 97 F | OXYGEN SATURATION: 97 % | WEIGHT: 315 LBS

## 2021-11-01 DIAGNOSIS — M75.111 PARTIAL NONTRAUMATIC TEAR OF BOTH ROTATOR CUFFS: ICD-10-CM

## 2021-11-01 DIAGNOSIS — M75.112 PARTIAL NONTRAUMATIC TEAR OF BOTH ROTATOR CUFFS: ICD-10-CM

## 2021-11-01 DIAGNOSIS — I10 ESSENTIAL HYPERTENSION: Primary | ICD-10-CM

## 2021-11-01 DIAGNOSIS — R73.03 PREDIABETES: ICD-10-CM

## 2021-11-01 DIAGNOSIS — E66.8 HYPOGONADAL OBESITY: ICD-10-CM

## 2021-11-01 DIAGNOSIS — Z13.9 ENCOUNTER FOR HEALTH-RELATED SCREENING: ICD-10-CM

## 2021-11-01 DIAGNOSIS — Z99.89 OSA ON CPAP: ICD-10-CM

## 2021-11-01 DIAGNOSIS — M45.0 ANKYLOSING SPONDYLITIS OF MULTIPLE SITES IN SPINE (HCC): ICD-10-CM

## 2021-11-01 DIAGNOSIS — D80.6 ANTIBODY DEFICIENCY SYNDROME (HCC): ICD-10-CM

## 2021-11-01 DIAGNOSIS — E78.2 MIXED HYPERLIPIDEMIA: ICD-10-CM

## 2021-11-01 DIAGNOSIS — G47.33 OSA ON CPAP: ICD-10-CM

## 2021-11-01 DIAGNOSIS — R35.0 URINARY FREQUENCY: ICD-10-CM

## 2021-11-01 PROBLEM — M45.9 ANKYLOSING SPONDYLITIS (HCC): Status: ACTIVE | Noted: 2021-11-01

## 2021-11-01 LAB
A/G RATIO: 1.5 (ref 1.1–2.2)
ALBUMIN SERPL-MCNC: 4.1 G/DL (ref 3.4–5)
ALP BLD-CCNC: 65 U/L (ref 40–129)
ALT SERPL-CCNC: 24 U/L (ref 10–40)
ANION GAP SERPL CALCULATED.3IONS-SCNC: 16 MMOL/L (ref 3–16)
AST SERPL-CCNC: 18 U/L (ref 15–37)
BILIRUB SERPL-MCNC: 0.3 MG/DL (ref 0–1)
BUN BLDV-MCNC: 8 MG/DL (ref 7–20)
C-REACTIVE PROTEIN: 46.6 MG/L (ref 0–5.1)
CALCIUM SERPL-MCNC: 9.7 MG/DL (ref 8.3–10.6)
CHLORIDE BLD-SCNC: 102 MMOL/L (ref 99–110)
CHOLESTEROL, TOTAL: 213 MG/DL (ref 0–199)
CO2: 26 MMOL/L (ref 21–32)
CREAT SERPL-MCNC: 0.7 MG/DL (ref 0.9–1.3)
ESTIMATED AVERAGE GLUCOSE: 108.3 MG/DL
GFR AFRICAN AMERICAN: >60
GFR NON-AFRICAN AMERICAN: >60
GLUCOSE BLD-MCNC: 83 MG/DL (ref 70–99)
HBA1C MFR BLD: 5.4 %
HDLC SERPL-MCNC: 37 MG/DL (ref 40–60)
LDL CHOLESTEROL CALCULATED: 149 MG/DL
POTASSIUM SERPL-SCNC: 3.9 MMOL/L (ref 3.5–5.1)
PROSTATE SPECIFIC ANTIGEN: 1.23 NG/ML (ref 0–4)
SEDIMENTATION RATE, ERYTHROCYTE: 91 MM/HR (ref 0–15)
SODIUM BLD-SCNC: 144 MMOL/L (ref 136–145)
TOTAL PROTEIN: 6.9 G/DL (ref 6.4–8.2)
TRIGL SERPL-MCNC: 136 MG/DL (ref 0–150)
TSH REFLEX: 1.69 UIU/ML (ref 0.27–4.2)
VITAMIN D 25-HYDROXY: 49.3 NG/ML
VLDLC SERPL CALC-MCNC: 27 MG/DL

## 2021-11-01 PROCEDURE — 90670 PCV13 VACCINE IM: CPT | Performed by: FAMILY MEDICINE

## 2021-11-01 PROCEDURE — 99214 OFFICE O/P EST MOD 30 MIN: CPT | Performed by: FAMILY MEDICINE

## 2021-11-01 PROCEDURE — 90471 IMMUNIZATION ADMIN: CPT | Performed by: FAMILY MEDICINE

## 2021-11-01 RX ORDER — POLYETHYLENE GLYCOL 3350 17 G/17G
17 POWDER, FOR SOLUTION ORAL DAILY
COMMUNITY

## 2021-11-01 RX ORDER — AZELASTINE 1 MG/ML
1 SPRAY, METERED NASAL 2 TIMES DAILY
COMMUNITY

## 2021-11-01 SDOH — ECONOMIC STABILITY: TRANSPORTATION INSECURITY
IN THE PAST 12 MONTHS, HAS THE LACK OF TRANSPORTATION KEPT YOU FROM MEDICAL APPOINTMENTS OR FROM GETTING MEDICATIONS?: NO

## 2021-11-01 SDOH — ECONOMIC STABILITY: TRANSPORTATION INSECURITY
IN THE PAST 12 MONTHS, HAS LACK OF TRANSPORTATION KEPT YOU FROM MEETINGS, WORK, OR FROM GETTING THINGS NEEDED FOR DAILY LIVING?: NO

## 2021-11-01 SDOH — ECONOMIC STABILITY: FOOD INSECURITY: WITHIN THE PAST 12 MONTHS, YOU WORRIED THAT YOUR FOOD WOULD RUN OUT BEFORE YOU GOT MONEY TO BUY MORE.: NEVER TRUE

## 2021-11-01 SDOH — ECONOMIC STABILITY: FOOD INSECURITY: WITHIN THE PAST 12 MONTHS, THE FOOD YOU BOUGHT JUST DIDN'T LAST AND YOU DIDN'T HAVE MONEY TO GET MORE.: NEVER TRUE

## 2021-11-01 ASSESSMENT — PATIENT HEALTH QUESTIONNAIRE - PHQ9
SUM OF ALL RESPONSES TO PHQ QUESTIONS 1-9: 0
1. LITTLE INTEREST OR PLEASURE IN DOING THINGS: 0
2. FEELING DOWN, DEPRESSED OR HOPELESS: 0
SUM OF ALL RESPONSES TO PHQ9 QUESTIONS 1 & 2: 0

## 2021-11-01 ASSESSMENT — SOCIAL DETERMINANTS OF HEALTH (SDOH): HOW HARD IS IT FOR YOU TO PAY FOR THE VERY BASICS LIKE FOOD, HOUSING, MEDICAL CARE, AND HEATING?: NOT HARD AT ALL

## 2021-11-03 LAB
SEX HORMONE BINDING GLOBULIN: 37 NMOL/L (ref 11–80)
TESTOSTERONE FREE-NONMALE: 33.8 PG/ML (ref 47–244)
TESTOSTERONE TOTAL: 191 NG/DL (ref 220–1000)

## 2021-11-08 ENCOUNTER — TELEPHONE (OUTPATIENT)
Dept: FAMILY MEDICINE CLINIC | Age: 51
End: 2021-11-08

## 2021-11-08 LAB
REASON FOR REJECTION: NORMAL
REJECTED TEST: NORMAL

## 2021-11-08 NOTE — TELEPHONE ENCOUNTER
The Request for a quantiferon gold was denied because there were not enough for the pt. Please advise. Chris Lopez

## 2021-11-08 NOTE — TELEPHONE ENCOUNTER
Patient has immune deficiency syndrome    This was ordered by his specialist  Please have them run this

## 2021-12-16 ENCOUNTER — OFFICE VISIT (OUTPATIENT)
Dept: FAMILY MEDICINE CLINIC | Age: 51
End: 2021-12-16
Payer: COMMERCIAL

## 2021-12-16 VITALS
HEART RATE: 96 BPM | HEIGHT: 64 IN | BODY MASS INDEX: 53.78 KG/M2 | DIASTOLIC BLOOD PRESSURE: 70 MMHG | OXYGEN SATURATION: 98 % | SYSTOLIC BLOOD PRESSURE: 120 MMHG | WEIGHT: 315 LBS

## 2021-12-16 DIAGNOSIS — M79.661 RIGHT CALF PAIN: Primary | ICD-10-CM

## 2021-12-16 PROCEDURE — 99213 OFFICE O/P EST LOW 20 MIN: CPT | Performed by: NURSE PRACTITIONER

## 2021-12-16 NOTE — PATIENT INSTRUCTIONS
We will call you tomorrow to know when your scheduled time and location is. Patient Education        Leg Pain: Care Instructions  Your Care Instructions  Many things can cause leg pain. Too much exercise or overuse can cause a muscle cramp (or charley horse). You can get leg cramps from not eating a balanced diet that has enough potassium, calcium, and other minerals. If you do not drink enough fluids or are taking certain medicines, you may develop leg cramps. Other causes of leg pain include injuries, blood flow problems, nerve damage, and twisted and enlarged veins (varicose veins). You can usually ease pain with self-care. Your doctor may recommend that you rest your leg and keep it elevated. Follow-up care is a key part of your treatment and safety. Be sure to make and go to all appointments, and call your doctor if you are having problems. It's also a good idea to know your test results and keep a list of the medicines you take. How can you care for yourself at home? · Take pain medicines exactly as directed. ? If the doctor gave you a prescription medicine for pain, take it as prescribed. ? If you are not taking a prescription pain medicine, ask your doctor if you can take an over-the-counter medicine. · Take any other medicines exactly as prescribed. Call your doctor if you think you are having a problem with your medicine. · Rest your leg while you have pain, and avoid standing for long periods of time. · Prop up your leg at or above the level of your heart when possible. · Make sure you are eating a balanced diet that is rich in calcium, potassium, and magnesium, especially if you are pregnant. · If directed by your doctor, put ice or a cold pack on the area for 10 to 20 minutes at a time. Put a thin cloth between the ice and your skin. · Your leg may be in a splint, a brace, or an elastic bandage, and you may have crutches to help you walk.  Follow your doctor's directions about how long to wear supports and how to use the crutches. When should you call for help? Call 911 anytime you think you may need emergency care. For example, call if:    · You have sudden chest pain and shortness of breath, or you cough up blood.     · Your leg is cool or pale or changes color. Call your doctor now or seek immediate medical care if:    · You have increasing or severe pain.     · Your leg suddenly feels weak and you cannot move it.     · You have signs of a blood clot, such as:  ? Pain in your calf, back of the knee, thigh, or groin. ? Redness and swelling in your leg or groin.     · You have signs of infection, such as:  ? Increased pain, swelling, warmth, or redness. ? Red streaks leading from the sore area. ? Pus draining from a place on your leg. ? A fever.     · You cannot bear weight on your leg. Watch closely for changes in your health, and be sure to contact your doctor if:    · You do not get better as expected. Where can you learn more? Go to [email or web address]://Crocs.TellApart. org and sign in to your reBuy.de account. Enter H251 in the Particle box to learn more about \"Leg Pain: Care Instructions. \"     If you do not have an account, please click on the \"Sign Up Now\" link. Current as of: July 1, 2021               Content Version: 13.0  © 7279-2104 Healthwise, Incorporated. Care instructions adapted under license by [facility] If you have questions about a medical condition or this instruction, always ask your healthcare professional. [place] [address] any warranty or liability for your use of this information.

## 2021-12-16 NOTE — PROGRESS NOTES
2021     Munir Kaur (:  1970) is a 54 y.o. male, here for evaluation of the following medical concerns:    Chief Complaint   Patient presents with    Leg Pain     right calf pain, x1 day, swollen, warm     Pain in right outer calf for about 24 hours. Patient denies injury. No recent long travel. Patient denies chest pain, shortness of breath or dizziness. Has recently started Humira for Ankylosing Spondylitis  Just started Humira for Ankylosing Spondylitis. Has had one dose. Denies a history of clotting issues or DVT/PE. Review of Systems   Constitutional: Negative. Respiratory: Negative. Cardiovascular: Negative. Gastrointestinal: Negative. Musculoskeletal:        Right lower leg pain   Neurological: Negative. Prior to Visit Medications    Medication Sig Taking? Authorizing Provider   Adalimumab 20 MG/0.4ML PSKT 0.4 mLs Yes Historical Provider, MD   polyethylene glycol (MIRALAX) 17 g PACK packet Take 17 g by mouth daily Yes Historical Provider, MD   azelastine (ASTELIN) 0.1 % nasal spray 1 spray by Nasal route 2 times daily Yes Historical Provider, MD   hydroCHLOROthiazide (HYDRODIURIL) 25 MG tablet TAKE 1 TABLET BY MOUTH EVERY MORNING Yes Maynard Olszewski, MD   cyclobenzaprine (FLEXERIL) 5 MG tablet Take 5 mg by mouth daily as needed Yes Historical Provider, MD   traMADol (ULTRAM) 50 MG tablet Take 50 mg by mouth every 6 hours as needed. Yes Historical Provider, MD   ALPRAZolam Alphonse Loyalhanna) 0.5 MG tablet Take 0.5 mg by mouth nightly as needed.  Yes Historical Provider, MD   docusate sodium (COLACE) 100 MG capsule Take 1 capsule by mouth 2 times daily Yes Maynard Olszewski, MD   Levothyroxine Sodium 75 MCG CAPS  Yes Historical Provider, MD   Cholecalciferol (VITAMIN D3) 86951 units CAPS Take by mouth once a week Yes Historical Provider, MD   PROAIR  (90 Base) MCG/ACT inhaler INHALE TWO PUFFS INTO THE LUNGS EVERY 4 HOURS AS NEEDED FOR WHEEZING Yes Maynard Olszewski, MD clobetasol (TEMOVATE) 0.05 % ointment Apply sparingly to affected area(s) bid prn for flares, up to 2 weeks at a time. Do not apply on cleared skin. Yes Alonzo Palacios MD        Social History     Tobacco Use    Smoking status: Former Smoker     Packs/day: 1.00     Years: 3.00     Pack years: 3.00     Types: Cigarettes     Quit date: 1990     Years since quittin.8    Smokeless tobacco: Never Used   Vaping Use    Vaping Use: Never used   Substance Use Topics    Alcohol use: Yes     Alcohol/week: 0.0 standard drinks     Comment: social    Drug use: No        Vitals:    21 1813   BP: 120/70   Pulse: 96   SpO2: 98%   Weight: (!) 349 lb (158.3 kg)   Height: 5' 4\" (1.626 m)     Estimated body mass index is 59.91 kg/m² as calculated from the following:    Height as of this encounter: 5' 4\" (1.626 m). Weight as of this encounter: 349 lb (158.3 kg). Physical Exam  Vitals and nursing note reviewed. Constitutional:       General: He is not in acute distress. Appearance: Normal appearance. He is obese. He is not ill-appearing. Cardiovascular:      Rate and Rhythm: Normal rate and regular rhythm. Heart sounds: Normal heart sounds, S1 normal and S2 normal. No murmur heard. Pulmonary:      Effort: Pulmonary effort is normal.      Breath sounds: Normal breath sounds and air entry. Musculoskeletal:      Right lower leg: Tenderness present. No deformity or bony tenderness. 1+ Edema present. Left lower le+ Edema present. Legs:       Comments: There is no warmth, abnormal edema or redness noted. DP +2, PT +1, color is pink, negative Homans. Skin:     General: Skin is warm and dry. Capillary Refill: Capillary refill takes less than 2 seconds. Neurological:      General: No focal deficit present. Mental Status: He is alert. Psychiatric:         Mood and Affect: Mood normal.     ASSESSMENT/PLAN:  1.  Right calf pain  Suspicion is low for DVT, however, due to obesity and recent history of starting Humira will rule out. More likely musculoskeletal.  - VL DUP LOWER EXTREMITY VENOUS RIGHT; Future  Encouraged patient to go directly to ER if chest pain or shortness of breath. Return in about 3 months (around 3/16/2022), or if symptoms worsen or fail to improve, for with Dr. Elfego Gresham.

## 2021-12-17 ENCOUNTER — TELEPHONE (OUTPATIENT)
Dept: FAMILY MEDICINE CLINIC | Age: 51
End: 2021-12-17

## 2021-12-17 ENCOUNTER — HOSPITAL ENCOUNTER (OUTPATIENT)
Dept: VASCULAR LAB | Age: 51
Discharge: HOME | End: 2021-12-17
Payer: COMMERCIAL

## 2021-12-17 DIAGNOSIS — M79.661 RIGHT CALF PAIN: ICD-10-CM

## 2021-12-17 PROCEDURE — 93971 EXTREMITY STUDY: CPT

## 2021-12-18 ASSESSMENT — ENCOUNTER SYMPTOMS
GASTROINTESTINAL NEGATIVE: 1
RESPIRATORY NEGATIVE: 1

## 2022-04-19 ENCOUNTER — OFFICE VISIT (OUTPATIENT)
Dept: FAMILY MEDICINE CLINIC | Age: 52
End: 2022-04-19
Payer: COMMERCIAL

## 2022-04-19 VITALS
WEIGHT: 315 LBS | SYSTOLIC BLOOD PRESSURE: 138 MMHG | BODY MASS INDEX: 53.78 KG/M2 | OXYGEN SATURATION: 97 % | DIASTOLIC BLOOD PRESSURE: 80 MMHG | HEART RATE: 97 BPM | HEIGHT: 64 IN

## 2022-04-19 DIAGNOSIS — I10 ESSENTIAL HYPERTENSION: Primary | ICD-10-CM

## 2022-04-19 DIAGNOSIS — M45.0 ANKYLOSING SPONDYLITIS OF MULTIPLE SITES IN SPINE (HCC): ICD-10-CM

## 2022-04-19 DIAGNOSIS — D80.6 ANTIBODY DEFICIENCY SYNDROME (HCC): ICD-10-CM

## 2022-04-19 DIAGNOSIS — R10.9 ABDOMINAL WALL PAIN: ICD-10-CM

## 2022-04-19 DIAGNOSIS — L91.8 SKIN TAG: ICD-10-CM

## 2022-04-19 DIAGNOSIS — F41.9 ANXIETY: ICD-10-CM

## 2022-04-19 DIAGNOSIS — E66.01 CLASS 3 SEVERE OBESITY DUE TO EXCESS CALORIES WITH SERIOUS COMORBIDITY AND BODY MASS INDEX (BMI) OF 50.0 TO 59.9 IN ADULT (HCC): ICD-10-CM

## 2022-04-19 PROBLEM — M54.50 CHRONIC BILATERAL LOW BACK PAIN WITHOUT SCIATICA: Status: ACTIVE | Noted: 2020-02-27

## 2022-04-19 PROBLEM — Z15.89 HLA B27 POSITIVE: Status: ACTIVE | Noted: 2020-02-27

## 2022-04-19 PROBLEM — E55.9 VITAMIN D DEFICIENCY: Status: ACTIVE | Noted: 2017-11-09

## 2022-04-19 PROBLEM — D84.9 IMMUNOSUPPRESSED STATUS (HCC): Status: ACTIVE | Noted: 2022-02-07

## 2022-04-19 PROBLEM — G89.29 CHRONIC BILATERAL LOW BACK PAIN WITHOUT SCIATICA: Status: ACTIVE | Noted: 2020-02-27

## 2022-04-19 PROCEDURE — 99214 OFFICE O/P EST MOD 30 MIN: CPT | Performed by: NURSE PRACTITIONER

## 2022-04-19 RX ORDER — ALPRAZOLAM 0.5 MG/1
0.5 TABLET ORAL NIGHTLY PRN
Qty: 10 TABLET | Refills: 0 | Status: SHIPPED | OUTPATIENT
Start: 2022-04-19 | End: 2022-04-29

## 2022-04-19 RX ORDER — HYDROCHLOROTHIAZIDE 25 MG/1
25 TABLET ORAL DAILY
Qty: 90 TABLET | Refills: 3 | Status: SHIPPED | OUTPATIENT
Start: 2022-04-19

## 2022-04-19 ASSESSMENT — PATIENT HEALTH QUESTIONNAIRE - PHQ9
SUM OF ALL RESPONSES TO PHQ QUESTIONS 1-9: 2
SUM OF ALL RESPONSES TO PHQ QUESTIONS 1-9: 2
SUM OF ALL RESPONSES TO PHQ9 QUESTIONS 1 & 2: 2
SUM OF ALL RESPONSES TO PHQ QUESTIONS 1-9: 2
SUM OF ALL RESPONSES TO PHQ QUESTIONS 1-9: 2
1. LITTLE INTEREST OR PLEASURE IN DOING THINGS: 1
2. FEELING DOWN, DEPRESSED OR HOPELESS: 1

## 2022-04-19 ASSESSMENT — ENCOUNTER SYMPTOMS
ABDOMINAL PAIN: 1
RESPIRATORY NEGATIVE: 1
BACK PAIN: 1

## 2022-04-19 NOTE — PROGRESS NOTES
2022     Sol Martinez (:  1970) is a 50 y.o. male, here for evaluation of the following medical concerns:    Chief Complaint   Patient presents with    Medication Check     Back/joint pain:  Seeing Rheumatology at [address] [place] [address]. Taking Humira bi-weekly with some improvement. He is still hopeful he will be able to return to practice eventually once he is able to get his health more management and more pain free. He has good support from his wife. R abdominal Pain:  Recently diagnosed with right external oblique tear. Is currently doing abdominal rehab with Dr. Sudhir Nur. Rehab has aggravated back pain but is still able to manage daily. HTN:  Well controlled, denies side effects, working on diet and exercise. Compliant with medication. Anxiety:  Has had issues with anxiety in his past when needing testing and claustrophobia. Will usually use Alprazolam sparingly as needed. Obesity:  He is working hard to lose weight. His starting weight was 400 pounds. He is currently down to 344 pounds today. Skin Tag:  Patient has several skin tags he would like to have removed. Requesting referral to dermatology. Dad has history of having several basal cell cancers removed. Recently diagnosed with antibody deficiency syndrome. He is being managed by Dr. Ellyn Saint (immunologist). He did receive a dose of monoclonal antibodies due to not creating a response to three of the covid vaccines. He will likely need to do this every six months but this is still not decided. Is in the process of filing for disability. Wt Readings from Last 3 Encounters:   22 (!) 344 lb (156 kg)   21 (!) 349 lb (158.3 kg)   21 (!) 351 lb 6.4 oz (159.4 kg)     Review of Systems   Constitutional: Negative. Respiratory: Negative. Cardiovascular: Negative. Gastrointestinal: Positive for abdominal pain. Muscular abdominal pain   Genitourinary: Negative. Musculoskeletal: Positive for back pain. Neurological: Negative. Prior to Visit Medications    Medication Sig Taking? Authorizing Provider   hydroCHLOROthiazide (HYDRODIURIL) 25 MG tablet Take 1 tablet by mouth daily Yes MANUEL Cowart CNP   ALPRAZolam Alfreida Bottoms) 0.5 MG tablet Take 1 tablet by mouth nightly as needed for Anxiety for up to 10 days. Yes MANUEL Cowart CNP   Adalimumab 20 MG/0.4ML PSKT 0.4 mLs Yes Historical Provider, MD   polyethylene glycol (MIRALAX) 17 g PACK packet Take 17 g by mouth daily Yes Historical Provider, MD   azelastine (ASTELIN) 0.1 % nasal spray 1 spray by Nasal route 2 times daily Yes Historical Provider, MD   cyclobenzaprine (FLEXERIL) 5 MG tablet Take 5 mg by mouth daily as needed Yes Historical Provider, MD   traMADol (ULTRAM) 50 MG tablet Take 50 mg by mouth every 6 hours as needed. Yes Historical Provider, MD   docusate sodium (COLACE) 100 MG capsule Take 1 capsule by mouth 2 times daily Yes Nikkie Chauhan MD   Levothyroxine Sodium 75 MCG CAPS  Yes Historical Provider, MD   Cholecalciferol (VITAMIN D3) 29723 units CAPS Take by mouth once a week Yes Historical Provider, MD   PROAIR  (90 Base) MCG/ACT inhaler INHALE TWO PUFFS INTO THE LUNGS EVERY 4 HOURS AS NEEDED FOR WHEEZING Yes Nikkie Chauhan MD   clobetasol (TEMOVATE) 0.05 % ointment Apply sparingly to affected area(s) bid prn for flares, up to 2 weeks at a time. Do not apply on cleared skin. Yes Chuy Terrell MD        Social History     Tobacco Use    Smoking status: Former Smoker     Packs/day: 1.00     Years: 3.00     Pack years: 3.00     Types: Cigarettes     Quit date: 1990     Years since quittin.2    Smokeless tobacco: Never Used   Vaping Use    Vaping Use: Never used   Substance Use Topics    Alcohol use:  Yes     Alcohol/week: 0.0 standard drinks     Comment: social    Drug use: No        Vitals:    22 1608   BP: 138/80   Pulse: 97   SpO2: 97%   Weight: (!) 344 lb (156 kg) Height: 5' 4\" (1.626 m)     Estimated body mass index is 59.05 kg/m² as calculated from the following:    Height as of this encounter: 5' 4\" (1.626 m). Weight as of this encounter: 344 lb (156 kg). Physical Exam  Vitals and nursing note reviewed. Constitutional:       General: He is not in acute distress. Appearance: Normal appearance. He is obese. He is not ill-appearing. Cardiovascular:      Rate and Rhythm: Normal rate and regular rhythm. Heart sounds: Normal heart sounds, S1 normal and S2 normal. No murmur heard. Pulmonary:      Effort: Pulmonary effort is normal.      Breath sounds: Normal breath sounds and air entry. Skin:     General: Skin is warm and dry. Capillary Refill: Capillary refill takes less than 2 seconds. Neurological:      General: No focal deficit present. Mental Status: He is alert. Psychiatric:         Mood and Affect: Mood normal.       ASSESSMENT/PLAN:  1. Essential hypertension  Stable  - hydroCHLOROthiazide (HYDRODIURIL) 25 MG tablet; Take 1 tablet by mouth daily  Dispense: 90 tablet; Refill: 3    2. Anxiety  Stable  - ALPRAZolam (XANAX) 0.5 MG tablet; Take 1 tablet by mouth nightly as needed for Anxiety for up to 10 days. Dispense: 10 tablet; Refill: 0    3. Skin tag  Referral to Peyman Nava  - External Referral To Dermatology    4. Class 3 severe obesity due to excess calories with serious comorbidity and body mass index (BMI) of 50.0 to 59.9 in adult [facility])  Strongly recommend eliminating concentrated sweets, reducing simple carbs. Avoid corn syrup and hydrogenated oils. Focus on fruits, vegs, whole grains, lean meats and push water. Fish oil, high fiber foods recommended. Recommended at least 30 minutes of aerobic exercise daily. Has made good progress with weight loss    5. Abdominal wall pain  Improving, doing therapy currently    6. Ankylosing spondylitis of multiple sites in spine ([address]  Slowly improving    7.  Antibody deficiency syndrome [facility])  Managed by Dr. Joseph Swift    Time spent: 45 minutes, >50% of which was spent face to face with patient discussing HPI/plan/reviewing records and coordinating care    Return in about 6 months (around 10/19/2022).

## 2022-04-19 NOTE — PATIENT INSTRUCTIONS
[address] Colon and Rectal Surgery - Mati Ascencio MD  [address]  [address] [facility], [address] [place] [address], [address] [place] [address] Ave  [phone]    [facility]  [address] [place] [address], [address] [address]

## 2022-06-07 PROBLEM — K76.0 STEATOSIS OF LIVER: Status: ACTIVE | Noted: 2022-06-07

## 2022-06-07 PROBLEM — R79.82 ELEVATED C-REACTIVE PROTEIN (CRP): Status: ACTIVE | Noted: 2022-06-07

## 2022-06-30 ENCOUNTER — TELEPHONE (OUTPATIENT)
Dept: FAMILY MEDICINE CLINIC | Age: 52
End: 2022-06-30

## 2022-06-30 ENCOUNTER — OFFICE VISIT (OUTPATIENT)
Dept: FAMILY MEDICINE CLINIC | Age: 52
End: 2022-06-30
Payer: COMMERCIAL

## 2022-06-30 VITALS
SYSTOLIC BLOOD PRESSURE: 138 MMHG | DIASTOLIC BLOOD PRESSURE: 88 MMHG | OXYGEN SATURATION: 97 % | HEIGHT: 64 IN | WEIGHT: 315 LBS | HEART RATE: 70 BPM | BODY MASS INDEX: 53.78 KG/M2

## 2022-06-30 DIAGNOSIS — H02.889 MEIBOMIAN GLAND DYSFUNCTION (MGD): ICD-10-CM

## 2022-06-30 DIAGNOSIS — M45.0 ANKYLOSING SPONDYLITIS OF MULTIPLE SITES IN SPINE (HCC): ICD-10-CM

## 2022-06-30 DIAGNOSIS — R79.82 ELEVATED C-REACTIVE PROTEIN (CRP): ICD-10-CM

## 2022-06-30 DIAGNOSIS — R79.89 LOW TESTOSTERONE: ICD-10-CM

## 2022-06-30 DIAGNOSIS — R59.0 ENLARGED LYMPH NODE IN NECK: Primary | ICD-10-CM

## 2022-06-30 DIAGNOSIS — I10 ESSENTIAL HYPERTENSION: Primary | ICD-10-CM

## 2022-06-30 DIAGNOSIS — E78.2 MIXED HYPERLIPIDEMIA: ICD-10-CM

## 2022-06-30 DIAGNOSIS — R73.03 PREDIABETES: ICD-10-CM

## 2022-06-30 DIAGNOSIS — Z12.5 SCREENING FOR PROSTATE CANCER: ICD-10-CM

## 2022-06-30 DIAGNOSIS — E66.01 CLASS 3 SEVERE OBESITY DUE TO EXCESS CALORIES WITH SERIOUS COMORBIDITY AND BODY MASS INDEX (BMI) OF 50.0 TO 59.9 IN ADULT (HCC): ICD-10-CM

## 2022-06-30 DIAGNOSIS — R53.83 OTHER FATIGUE: ICD-10-CM

## 2022-06-30 DIAGNOSIS — M45.9 ANKYLOSING SPONDYLITIS, UNSPECIFIED SITE OF SPINE (HCC): ICD-10-CM

## 2022-06-30 DIAGNOSIS — B37.2 YEAST DERMATITIS: ICD-10-CM

## 2022-06-30 DIAGNOSIS — E55.9 VITAMIN D DEFICIENCY: ICD-10-CM

## 2022-06-30 PROCEDURE — 99214 OFFICE O/P EST MOD 30 MIN: CPT | Performed by: NURSE PRACTITIONER

## 2022-06-30 ASSESSMENT — PATIENT HEALTH QUESTIONNAIRE - PHQ9
SUM OF ALL RESPONSES TO PHQ QUESTIONS 1-9: 2
1. LITTLE INTEREST OR PLEASURE IN DOING THINGS: 1
SUM OF ALL RESPONSES TO PHQ9 QUESTIONS 1 & 2: 2
SUM OF ALL RESPONSES TO PHQ QUESTIONS 1-9: 2
2. FEELING DOWN, DEPRESSED OR HOPELESS: 1
SUM OF ALL RESPONSES TO PHQ QUESTIONS 1-9: 2
SUM OF ALL RESPONSES TO PHQ QUESTIONS 1-9: 2

## 2022-06-30 NOTE — PROGRESS NOTES
mouth 2 times daily Yes Jad Padilla MD   Levothyroxine Sodium 75 MCG CAPS  Yes Historical Provider, MD   Cholecalciferol (VITAMIN D3) 70162 units CAPS Take by mouth once a week Yes Historical Provider, MD   PROAIR  (90 Base) MCG/ACT inhaler INHALE TWO PUFFS INTO THE LUNGS EVERY 4 HOURS AS NEEDED FOR WHEEZING Yes Jad Padilla MD   clobetasol (TEMOVATE) 0.05 % ointment Apply sparingly to affected area(s) bid prn for flares, up to 2 weeks at a time. Do not apply on cleared skin. Yes Martin Arias MD        Social History     Tobacco Use    Smoking status: Former Smoker     Packs/day: 1.00     Years: 3.00     Pack years: 3.00     Types: Cigarettes     Quit date: 1990     Years since quittin.4    Smokeless tobacco: Never Used   Vaping Use    Vaping Use: Never used   Substance Use Topics    Alcohol use: Yes     Alcohol/week: 0.0 standard drinks     Comment: social    Drug use: No        Vitals:    22 1153   BP: 138/88   Pulse: 70   SpO2: 97%   Weight: (!) 332 lb (150.6 kg)   Height: 5' 4\" (1.626 m)     Estimated body mass index is 56.99 kg/m² as calculated from the following:    Height as of this encounter: 5' 4\" (1.626 m). Weight as of this encounter: 332 lb (150.6 kg). Physical Exam  Vitals and nursing note reviewed. Constitutional:       General: He is not in acute distress. Appearance: Normal appearance. He is normal weight. He is not ill-appearing. Cardiovascular:      Rate and Rhythm: Normal rate and regular rhythm. Heart sounds: Normal heart sounds, S1 normal and S2 normal. No murmur heard. Pulmonary:      Effort: Pulmonary effort is normal.      Breath sounds: Normal breath sounds and air entry. Chest:   Breasts:      Right: Supraclavicular adenopathy present. Left: No supraclavicular adenopathy. Lymphadenopathy:      Head:      Right side of head: No submental, submandibular, tonsillar, preauricular or posterior auricular adenopathy.       Left side of head: No submental, submandibular, tonsillar, preauricular or posterior auricular adenopathy. Cervical: No cervical adenopathy. Right cervical: No superficial cervical adenopathy. Left cervical: No superficial cervical adenopathy. Upper Body:      Right upper body: Supraclavicular adenopathy present. Left upper body: No supraclavicular adenopathy. Comments: Area is small, approximately size of dime, mobile and nontender on palpation, slightly firm   Skin:     General: Skin is warm and dry. Capillary Refill: Capillary refill takes less than 2 seconds. Neurological:      General: No focal deficit present. Mental Status: He is alert. Psychiatric:         Mood and Affect: Mood normal.         ASSESSMENT/PLAN:  1. Enlarged lymph node in neck  Likely lipoma or benign lymphnode  - US HEAD NECK SOFT TISSUE THYROID; Future    2. Meibomian gland dysfunction (MGD)  Improving with treatment    3. Class 3 severe obesity due to excess calories with serious comorbidity and body mass index (BMI) of 50.0 to 59.9 in adult [facility])  Doing well, has had slow consistent weight loss  Recommended at least 30 minutes of aerobic exercise daily. 4. Yeast dermatitis  Resolved    5. Ankylosing spondylitis of multiple sites in spine ([facility] [address]  Unstable, managed by Rheumatology  Likely will start Cosentyx or Enbrel    Return if symptoms worsen or fail to improve.

## 2022-07-01 PROBLEM — H02.889 MEIBOMIAN GLAND DYSFUNCTION (MGD): Status: ACTIVE | Noted: 2022-07-01

## 2022-07-01 ASSESSMENT — ENCOUNTER SYMPTOMS
GASTROINTESTINAL NEGATIVE: 1
RESPIRATORY NEGATIVE: 1

## 2022-07-12 ENCOUNTER — HOSPITAL ENCOUNTER (OUTPATIENT)
Dept: ULTRASOUND IMAGING | Age: 52
Discharge: HOME | End: 2022-07-12
Payer: COMMERCIAL

## 2022-07-12 DIAGNOSIS — R59.0 ENLARGED LYMPH NODE IN NECK: ICD-10-CM

## 2022-07-12 PROCEDURE — 76999 ECHO EXAMINATION PROCEDURE: CPT

## 2022-08-02 ENCOUNTER — TELEPHONE (OUTPATIENT)
Dept: PULMONOLOGY | Age: 52
End: 2022-08-02

## 2022-08-02 NOTE — TELEPHONE ENCOUNTER
Patient cancelled appointment on 8/2/22 with Dr. Esmer Da Silva for 1 year sleep. Reason: pt has a fever, does not feel well    Patient did reschedule appointment. Appointment rescheduled for 10/12/22. Last OV 7/13/21      Assessment:  Chronic cough, cough variant asthma  Severe Obstructive Sleep Apnea - AHI down to 2 from 68 with APAP     Plan:   Albuterol PRN  APAP 10 to 14, I adjusted today   No driving while sleepy; weight loss recommended; systemic benefits of CPAP therapy have been discussed. CPAP supplies with [employer]  F/U in one year.         Patient is MD Psychiatry

## 2022-09-13 ENCOUNTER — OFFICE VISIT (OUTPATIENT)
Dept: FAMILY MEDICINE CLINIC | Age: 52
End: 2022-09-13
Payer: COMMERCIAL

## 2022-09-13 VITALS
HEART RATE: 78 BPM | OXYGEN SATURATION: 98 % | WEIGHT: 315 LBS | BODY MASS INDEX: 53.78 KG/M2 | HEIGHT: 64 IN | SYSTOLIC BLOOD PRESSURE: 138 MMHG | DIASTOLIC BLOOD PRESSURE: 80 MMHG

## 2022-09-13 DIAGNOSIS — R59.0 ENLARGED LYMPH NODE IN NECK: Primary | ICD-10-CM

## 2022-09-13 PROCEDURE — 99213 OFFICE O/P EST LOW 20 MIN: CPT | Performed by: NURSE PRACTITIONER

## 2022-09-13 ASSESSMENT — PATIENT HEALTH QUESTIONNAIRE - PHQ9
SUM OF ALL RESPONSES TO PHQ QUESTIONS 1-9: 1
SUM OF ALL RESPONSES TO PHQ9 QUESTIONS 1 & 2: 1
SUM OF ALL RESPONSES TO PHQ QUESTIONS 1-9: 1
1. LITTLE INTEREST OR PLEASURE IN DOING THINGS: 0
SUM OF ALL RESPONSES TO PHQ QUESTIONS 1-9: 1
SUM OF ALL RESPONSES TO PHQ QUESTIONS 1-9: 1
2. FEELING DOWN, DEPRESSED OR HOPELESS: 1

## 2022-09-13 ASSESSMENT — ENCOUNTER SYMPTOMS
CHEST TIGHTNESS: 0
COUGH: 0
SHORTNESS OF BREATH: 0

## 2022-09-13 NOTE — PROGRESS NOTES
2022     Munir Kaur (:  1970) is a 50 y.o. male, here for evaluation of the following medical concerns:    Chief Complaint   Patient presents with    Other     Swollen lymph node follow up     Enlarged lymph node:  patient still has enlarged lymph node over right clavicle. Recommended for follow up in a few weeks. Denies fever, pain, feeling ill. Wt Readings from Last 3 Encounters:   22 (!) 330 lb (149.7 kg)   22 (!) 332 lb (150.6 kg)   22 (!) 344 lb (156 kg)     Review of Systems   Constitutional:  Negative for fatigue and fever. Respiratory:  Negative for cough, chest tightness and shortness of breath. Cardiovascular:  Negative for chest pain, palpitations and leg swelling. Hematological:  Positive for adenopathy. Prior to Visit Medications    Medication Sig Taking? Authorizing Provider   hydroCHLOROthiazide (HYDRODIURIL) 25 MG tablet Take 1 tablet by mouth daily Yes Radha Crow APRN - CNP   Adalimumab 20 MG/0.4ML PSKT 0.4 mLs Yes Historical Provider, MD   polyethylene glycol (MIRALAX) 17 g PACK packet Take 17 g by mouth daily Yes Historical Provider, MD   azelastine (ASTELIN) 0.1 % nasal spray 1 spray by Nasal route 2 times daily Yes Historical Provider, MD   cyclobenzaprine (FLEXERIL) 5 MG tablet Take 5 mg by mouth daily as needed Yes Historical Provider, MD   traMADol (ULTRAM) 50 MG tablet Take 50 mg by mouth every 6 hours as needed.  Yes Historical Provider, MD   docusate sodium (COLACE) 100 MG capsule Take 1 capsule by mouth 2 times daily Yes Maynard Olszewski, MD   Levothyroxine Sodium 75 MCG CAPS  Yes Historical Provider, MD   Cholecalciferol (VITAMIN D3) 45701 units CAPS Take by mouth once a week Yes Historical Provider, MD   PROAIR  (90 Base) MCG/ACT inhaler INHALE TWO PUFFS INTO THE LUNGS EVERY 4 HOURS AS NEEDED FOR WHEEZING Yes Maynard Olszewski, MD   clobetasol (TEMOVATE) 0.05 % ointment Apply sparingly to affected area(s) bid prn for flares, up to 2 weeks at a time. Do not apply on cleared skin. Yes Dhara Suarez MD        Social History     Tobacco Use    Smoking status: Former     Packs/day: 1.00     Years: 3.00     Pack years: 3.00     Types: Cigarettes     Quit date: 1990     Years since quittin.6    Smokeless tobacco: Never   Vaping Use    Vaping Use: Never used   Substance Use Topics    Alcohol use: Yes     Alcohol/week: 0.0 standard drinks     Comment: social    Drug use: No        Vitals:    22 0808   BP: 138/80   Pulse: 78   SpO2: 98%   Weight: (!) 330 lb (149.7 kg)   Height: 5' 4\" (1.626 m)     Estimated body mass index is 56.64 kg/m² as calculated from the following:    Height as of this encounter: 5' 4\" (1.626 m). Weight as of this encounter: 330 lb (149.7 kg). Physical Exam  Vitals and nursing note reviewed. Constitutional:       General: He is awake. He is not in acute distress. Appearance: Normal appearance. He is well-developed. He is morbidly obese. He is not ill-appearing. Cardiovascular:      Rate and Rhythm: Normal rate and regular rhythm. Heart sounds: Normal heart sounds, S1 normal and S2 normal. No murmur heard. Pulmonary:      Effort: Pulmonary effort is normal.      Breath sounds: Normal breath sounds and air entry. Chest:   Breasts:     Right: Supraclavicular adenopathy present. Left: No supraclavicular adenopathy. Lymphadenopathy:      Head:      Right side of head: No submental, submandibular, tonsillar, preauricular, posterior auricular or occipital adenopathy. Left side of head: No submental, submandibular, tonsillar, preauricular, posterior auricular or occipital adenopathy. Cervical: No cervical adenopathy. Upper Body:      Right upper body: Supraclavicular adenopathy present. Left upper body: No supraclavicular adenopathy.       Comments: Slightly enlarged from last visit, mobile, approximate size of grape, soft, nontender   Skin:     General: Skin is warm and dry.      Capillary Refill: Capillary refill takes less than 2 seconds. Neurological:      General: No focal deficit present. Mental Status: He is alert. Psychiatric:         Mood and Affect: Mood normal.         Behavior: Behavior is cooperative. FINDINGS:   Single nonenlarged 0.6 cm x 0.6 cm x 0.4 cm right supraclavicular lymph node   at the area of concern with relatively symmetric cortical thickening, partial   hilar effacement, and no overtly suspicious features. Normal adjacent skin,   subcutaneous tissues, vessels, and muscle. Impression   A nonenlarged and reactive appearing right supraclavicular lymph node   accounts for the palpable finding. Recommend clinical follow-up with   consideration for short-term follow-up sonography if the finding does not   resolve spontaneously. ASSESSMENT/PLAN:  1. Enlarged lymph node in neck  Unstable, slightly enlarged from last visit  - [address]; Future    Return in about 3 months (around 12/13/2022), or if symptoms worsen or fail to improve.

## 2022-10-03 ENCOUNTER — HOSPITAL ENCOUNTER (OUTPATIENT)
Dept: ULTRASOUND IMAGING | Age: 52
Discharge: HOME | End: 2022-10-03
Payer: COMMERCIAL

## 2022-10-03 DIAGNOSIS — R59.0 ENLARGED LYMPH NODE IN NECK: ICD-10-CM

## 2022-10-03 PROCEDURE — 76999 ECHO EXAMINATION PROCEDURE: CPT

## 2022-10-12 ENCOUNTER — OFFICE VISIT (OUTPATIENT)
Dept: PULMONOLOGY | Age: 52
End: 2022-10-12
Payer: COMMERCIAL

## 2022-10-12 VITALS
BODY MASS INDEX: 53.78 KG/M2 | DIASTOLIC BLOOD PRESSURE: 84 MMHG | HEART RATE: 72 BPM | WEIGHT: 315 LBS | OXYGEN SATURATION: 97 % | SYSTOLIC BLOOD PRESSURE: 132 MMHG | HEIGHT: 64 IN

## 2022-10-12 DIAGNOSIS — G47.33 OSA ON CPAP: Primary | ICD-10-CM

## 2022-10-12 DIAGNOSIS — Z99.89 OSA ON CPAP: Primary | ICD-10-CM

## 2022-10-12 PROCEDURE — 99213 OFFICE O/P EST LOW 20 MIN: CPT | Performed by: INTERNAL MEDICINE

## 2022-10-12 RX ORDER — ALBUTEROL SULFATE 90 UG/1
AEROSOL, METERED RESPIRATORY (INHALATION)
Qty: 1 EACH | Refills: 5 | Status: SHIPPED | OUTPATIENT
Start: 2022-10-12

## 2022-10-12 ASSESSMENT — SLEEP AND FATIGUE QUESTIONNAIRES
HOW LIKELY ARE YOU TO NOD OFF OR FALL ASLEEP WHILE SITTING QUIETLY AFTER LUNCH WITHOUT ALCOHOL: 0
HOW LIKELY ARE YOU TO NOD OFF OR FALL ASLEEP WHEN YOU ARE A PASSENGER IN A CAR FOR AN HOUR WITHOUT A BREAK: 2
HOW LIKELY ARE YOU TO NOD OFF OR FALL ASLEEP WHILE LYING DOWN TO REST IN THE AFTERNOON WHEN CIRCUMSTANCES PERMIT: 2
HOW LIKELY ARE YOU TO NOD OFF OR FALL ASLEEP WHILE WATCHING TV: 1
ESS TOTAL SCORE: 5
HOW LIKELY ARE YOU TO NOD OFF OR FALL ASLEEP WHILE SITTING INACTIVE IN A PUBLIC PLACE: 0
HOW LIKELY ARE YOU TO NOD OFF OR FALL ASLEEP IN A CAR, WHILE STOPPED FOR A FEW MINUTES IN TRAFFIC: 0
HOW LIKELY ARE YOU TO NOD OFF OR FALL ASLEEP WHILE SITTING AND TALKING TO SOMEONE: 0
NECK CIRCUMFERENCE (INCHES): 19.25
HOW LIKELY ARE YOU TO NOD OFF OR FALL ASLEEP WHILE SITTING AND READING: 0

## 2022-10-12 NOTE — PROGRESS NOTES
Chief Complaint: dyspnea    Referring Provider: Carlo Dowling HPI:   CHESTER treated with benefit with [id number]. Has RESMED machine, purchased outright. I reviewed data, 100% compliant, AHI less than 1. Uses albuterol occasionally     PRESENTING HPI: Respiratory illness several years ago, followed by feeling like he does not get adequate lung volumes, associated with shortness of breath especially in shower, waking cough, post tussive emesis, reactive in cold. Has Albuterol PRN which is somewhat helpful, especially for wheezing. Sleep:  Several year h/o severe snoring associated with few witnessed apneas, worse on back, no treatments tried so far. Was evaluated by Kristansherie Niño    Subsequent: dyspnea improved over last 8 weeks, albuterol clearly helpful, sometimes wheezes  2 trials Dulera, both followed by febrile illness and worsened cough. Cough has persisted for 3 months, is productive, especially severe at night. Physical Exam:  Blood pressure 132/84, pulse 72, height 5' 4\" (1.626 m), weight (!) 333 lb (151 kg), SpO2 97 %.'371# in 2017, 392# in 2018, 399# in 2019, 333# in 2022  Constitutional:  No acute distress. HENT:  Oropharynx is clear and moist.    Neck: No tracheal deviation present. Cardiovascular: Normal heart sounds. No lower extremity edema. Pulmonary/Chest: No wheezes. No rhonchi. No rales. No decreased breath sounds. No accessory muscle usage or stridor. Musculoskeletal: No cyanosis. No clubbing. Skin: Skin is warm and dry. Psychiatric: Normal mood and affect. Neurologic: speech fluent, alert and oriented, strength symmetric      Data:   ECHO  Mild concentric hypertrophy    Jan 2021 Cardiac CT with no CAD    PFT  FEV1 3.5 L 96% TLC 5.36 L 88% DLCO 26.3 89%    PSG 2013 with Corser #20 total apneas, many hypopneas, AHI of 68    CT CHEST 6/5/15     Findings:       Lung windows demonstrate that the central airways are patent.  The   lungs are clear, without evidence of acute airspace consolidation. There is no evidence of a pneumothorax or pleural effusion. There   is a calcified granuloma within the right lower lobe. No   suspicious pulmonary nodule or mass is evident. Mediastinal windows demonstrate no pathologic lymphadenopathy. The   heart, pericardium, and intrathoracic great vessels are   unremarkable in noncontrast appearance. Incidental note is made of   bilateral gynecomastia. Limited images of the upper abdomen demonstrate that the adrenal   glands are normal. The remainder of the upper abdomen is   unremarkable. Bone windows demonstrate mild degenerative change   throughout the spine without evidence of an osteolytic or   osteoblastic lesion. Impression   IMPRESSION:       1. No acute intrapulmonary findings. 2. Calcified right lower lobe granuloma, benign. 3. Bilateral gynecomastia. Assessment:  Severe Obstructive Sleep Apnea - AHI down to 2 from 68 with APAP    Not addressed today:  H/O mild intermittent asthma   H/O ankylosing spondylitis   H/O specific antibody deficiency     Plan:   Albuterol PRN  APAP 10 to 14   No driving while sleepy; weight loss recommended; systemic benefits of CPAP therapy have been discussed. CPAP supplies with [employer]   F/U in one year.       Patient is MD Psychiatry

## 2022-10-12 NOTE — PATIENT INSTRUCTIONS
Never drive if you are feeling sleepy    Sleep Hygiene. .. Tips for better sleep. .. Avoid naps. This will ensure you are sleepy at bedtime. If you have to take a nap, sleep less than 1 hour, before 3 pm.  Sleep only when sleepy; this reduces the time you are awake in bed. Regular exercise is recommended to help you deepen your sleep, but not within 4-6 hours of your bedtime. Timing of exercise is important, aim to exercise early in the morning or early afternoon. A light snack may help you fall asleep. Warm milk and foods high in the amino acid tryptophan, such as bananas, may help you to sleep  Be sure to avoid heavy, spicy or sugary foods 4-6 hours before bedtime and avoid at snack time. Stay away from stimulants such as caffeine and nicotine for at least 4-6 hours before bed. Stimulants can interfere with your ability to fall asleep. Caffeine is found in tea, cola, coffee, cocoa and chocolate and is best avoided at bedtime. Nicotine is found in tobacco products. Avoid alcohol 4-6 hours before bedtime. Alcohol has an immediate sleep-inducing effect, after a few hours when alcohol levels fall there is a stimulant or wake-up effect and will cause fragmented sleep. Sleep rituals are important. Give your body clues it is time to slow down and sleep. Examples include; yoga, deep breathing, listen to relaxing music, a hot bath or a few minutes of reading. Have a fixed bedtime and awakening time, Even on weekends! You will feel better keeping a regular sleep cycle, even if you are retired or not working. Get into your favorite sleep position. If not asleep in 30 minutes, get up and do something boring until you feel sleepy. Remember not to expose yourself to bright lights such as TV, phone or tablet screens. Only use your bed for sleeping. Do not use your bed as an office, workroom or recreation room. Use comfortable bedding. Uncomfortable bedding can prevent good sleep.   Ensure your bedroom is quiet and comfortable. A cooler room along with enough blankets to stay warm is recommended. If your room is too noisy, try a white noise machine. If too bright, try black out shades or an eye mask. Dont take worries to bed. Leave worries about work, school etc. behind you when you go to bed. Some people find it helpful to assign a worry period in the evening or late afternoon to write down your worries and get them out of your system.

## 2022-12-14 ENCOUNTER — OFFICE VISIT (OUTPATIENT)
Dept: FAMILY MEDICINE CLINIC | Age: 52
End: 2022-12-14
Payer: COMMERCIAL

## 2022-12-14 VITALS
DIASTOLIC BLOOD PRESSURE: 74 MMHG | OXYGEN SATURATION: 76 % | WEIGHT: 315 LBS | HEART RATE: 98 BPM | HEIGHT: 64 IN | SYSTOLIC BLOOD PRESSURE: 121 MMHG | BODY MASS INDEX: 53.78 KG/M2

## 2022-12-14 DIAGNOSIS — H02.889 MEIBOMIAN GLAND DYSFUNCTION (MGD): ICD-10-CM

## 2022-12-14 DIAGNOSIS — F41.9 ANXIETY: ICD-10-CM

## 2022-12-14 DIAGNOSIS — M45.9 ANKYLOSING SPONDYLITIS, UNSPECIFIED SITE OF SPINE (HCC): ICD-10-CM

## 2022-12-14 DIAGNOSIS — R59.0 ENLARGED LYMPH NODE IN NECK: Primary | ICD-10-CM

## 2022-12-14 PROCEDURE — 3074F SYST BP LT 130 MM HG: CPT | Performed by: NURSE PRACTITIONER

## 2022-12-14 PROCEDURE — 99214 OFFICE O/P EST MOD 30 MIN: CPT | Performed by: NURSE PRACTITIONER

## 2022-12-14 PROCEDURE — 3078F DIAST BP <80 MM HG: CPT | Performed by: NURSE PRACTITIONER

## 2022-12-14 SDOH — ECONOMIC STABILITY: FOOD INSECURITY: WITHIN THE PAST 12 MONTHS, YOU WORRIED THAT YOUR FOOD WOULD RUN OUT BEFORE YOU GOT MONEY TO BUY MORE.: NEVER TRUE

## 2022-12-14 SDOH — ECONOMIC STABILITY: FOOD INSECURITY: WITHIN THE PAST 12 MONTHS, THE FOOD YOU BOUGHT JUST DIDN'T LAST AND YOU DIDN'T HAVE MONEY TO GET MORE.: NEVER TRUE

## 2022-12-14 ASSESSMENT — SOCIAL DETERMINANTS OF HEALTH (SDOH): HOW HARD IS IT FOR YOU TO PAY FOR THE VERY BASICS LIKE FOOD, HOUSING, MEDICAL CARE, AND HEATING?: NOT HARD AT ALL

## 2022-12-14 ASSESSMENT — PATIENT HEALTH QUESTIONNAIRE - PHQ9
SUM OF ALL RESPONSES TO PHQ QUESTIONS 1-9: 2
1. LITTLE INTEREST OR PLEASURE IN DOING THINGS: 1
2. FEELING DOWN, DEPRESSED OR HOPELESS: 1
SUM OF ALL RESPONSES TO PHQ9 QUESTIONS 1 & 2: 2
SUM OF ALL RESPONSES TO PHQ QUESTIONS 1-9: 2

## 2022-12-14 ASSESSMENT — ENCOUNTER SYMPTOMS
GASTROINTESTINAL NEGATIVE: 1
RESPIRATORY NEGATIVE: 1

## 2022-12-14 NOTE — PROGRESS NOTES
2022     Marlena Javed (:  1970) is a 50 y.o. male, here for evaluation of the following medical concerns:    Chief Complaint   Patient presents with    Medication Check     Patient arrives today for information update on health. PM&R doctor is leaving practice - does not have one at this time. He is in need of someone to manage his disability certification. PT usually writes restrictions. Would like to request me to take over today. Additionally will need to take over Tramadol scripts PRN breakthrough musculoskeletal pain. Only uses this as needed. Will be trialing Prolotherapy injection. D5W injections. MGD:  he is going to have the treatment with Zahra Lamb - MGD expert. Endocrine:  TSH, Vitamin D, HgA1c, lipid stable    Anxiety:  doing well, primarily stable, still isolates. He is back on Humira - every other week. CRP/sedrate is better. He is seeing Rheum follow up in February. He is currently seeing Dr. Vassie Apgar at [address] [place] [address]  for Rheumatology    Right Lymphadenopathy:  saw ENT at [address] [place] [address], ordered 3rd US and based on that and then will likely biopsy. Dr. Rigo Gudino is pulmonology/sleep  Dr. Kathie Davalos at   Dr. Jolene Sanchez neurosurgery. Wt Readings from Last 3 Encounters:   22 (!) 332 lb (150.6 kg)   10/12/22 (!) 333 lb (151 kg)   22 (!) 330 lb (149.7 kg)     Review of Systems   Constitutional: Negative. Respiratory: Negative. Cardiovascular: Negative. Gastrointestinal: Negative. Genitourinary: Negative. Neurological: Negative. Prior to Visit Medications    Medication Sig Taking?  Authorizing Provider   albuterol sulfate HFA (PROAIR HFA) 108 (90 Base) MCG/ACT inhaler INHALE TWO PUFFS INTO THE LUNGS EVERY 4 HOURS AS NEEDED FOR WHEEZING or shortness of breath Yes Tabby Macias MD   hydroCHLOROthiazide (HYDRODIURIL) 25 MG tablet Take 1 tablet by mouth daily Yes MANUEL Walsh - CNP   Adalimumab 20 MG/0.4ML PSKT 0.4 mLs Yes Historical Provider, MD   polyethylene glycol (MIRALAX) 17 g PACK packet Take 17 g by mouth daily Yes Historical Provider, MD   azelastine (ASTELIN) 0.1 % nasal spray 1 spray by Nasal route 2 times daily Yes Historical Provider, MD   cyclobenzaprine (FLEXERIL) 5 MG tablet Take 5 mg by mouth daily as needed Yes Historical Provider, MD   traMADol (ULTRAM) 50 MG tablet Take 50 mg by mouth every 6 hours as needed. Yes Historical Provider, MD   docusate sodium (COLACE) 100 MG capsule Take 1 capsule by mouth 2 times daily Yes Angle Manzano MD   Levothyroxine Sodium 75 MCG CAPS  Yes Historical Provider, MD   Cholecalciferol (VITAMIN D3) 94748 units CAPS Take by mouth once a week Yes Historical Provider, MD   clobetasol (TEMOVATE) 0.05 % ointment Apply sparingly to affected area(s) bid prn for flares, up to 2 weeks at a time. Do not apply on cleared skin. Yes Rickie Skiff, MD        Social History     Tobacco Use    Smoking status: Former     Packs/day: 1.00     Years: 3.00     Pack years: 3.00     Types: Cigarettes     Quit date: 1990     Years since quittin.8    Smokeless tobacco: Never   Vaping Use    Vaping Use: Never used   Substance Use Topics    Alcohol use: Yes     Alcohol/week: 0.0 standard drinks     Comment: social    Drug use: No        Vitals:    22 1312   BP: 121/74   Pulse: 98   SpO2: (!) 76%   Weight: (!) 332 lb (150.6 kg)   Height: 5' 4\" (1.626 m)     Estimated body mass index is 56.99 kg/m² as calculated from the following:    Height as of this encounter: 5' 4\" (1.626 m). Weight as of this encounter: 332 lb (150.6 kg). Physical Exam  Vitals and nursing note reviewed. Constitutional:       General: He is awake. He is not in acute distress. Appearance: Normal appearance. He is well-developed and well-groomed. He is morbidly obese. He is not ill-appearing. HENT:      Head:     Cardiovascular:      Rate and Rhythm: Normal rate.       Heart sounds: S1 normal and S2 normal. Pulmonary:      Effort: Pulmonary effort is normal. No respiratory distress. Breath sounds: Normal air entry. Lymphadenopathy:      Upper Body:      Right upper body: Supraclavicular adenopathy present. Comments: Larger from previous exam, mobile, non-painful. Skin:     General: Skin is warm and dry. Neurological:      General: No focal deficit present. Mental Status: He is alert and oriented to person, place, and time. Psychiatric:         Attention and Perception: Attention and perception normal.         Mood and Affect: Affect normal. Mood is anxious. Speech: Speech normal.         Behavior: Behavior normal. Behavior is cooperative. Thought Content: Thought content normal.         Judgment: Judgment normal.       ASSESSMENT/PLAN:  1. Enlarged lymph node in neck  stable    2. Ankylosing spondylitis, unspecified site of spine ([facility]  stable    3. Meibomian gland dysfunction (MGD)  Treatment in a few months    4. Anxiety  Recommend going out of home a little more often    Time spent: 45 minutes, >50% of which was spent face to face with patient discussing HPI/plan/reviewing records and coordinating care      Return in about 3 months (around 3/14/2023), or if symptoms worsen or fail to improve.

## 2023-01-27 ENCOUNTER — OFFICE VISIT (OUTPATIENT)
Dept: FAMILY MEDICINE CLINIC | Age: 53
End: 2023-01-27
Payer: COMMERCIAL

## 2023-01-27 VITALS
WEIGHT: 315 LBS | HEIGHT: 64 IN | BODY MASS INDEX: 53.78 KG/M2 | SYSTOLIC BLOOD PRESSURE: 142 MMHG | DIASTOLIC BLOOD PRESSURE: 92 MMHG | HEART RATE: 80 BPM

## 2023-01-27 DIAGNOSIS — R21 RASH: Primary | ICD-10-CM

## 2023-01-27 DIAGNOSIS — I10 ESSENTIAL HYPERTENSION: ICD-10-CM

## 2023-01-27 PROCEDURE — 99214 OFFICE O/P EST MOD 30 MIN: CPT | Performed by: STUDENT IN AN ORGANIZED HEALTH CARE EDUCATION/TRAINING PROGRAM

## 2023-01-27 PROCEDURE — 3077F SYST BP >= 140 MM HG: CPT | Performed by: STUDENT IN AN ORGANIZED HEALTH CARE EDUCATION/TRAINING PROGRAM

## 2023-01-27 PROCEDURE — 3080F DIAST BP >= 90 MM HG: CPT | Performed by: STUDENT IN AN ORGANIZED HEALTH CARE EDUCATION/TRAINING PROGRAM

## 2023-01-27 RX ORDER — FLUCONAZOLE 150 MG/1
150 TABLET ORAL WEEKLY
Qty: 4 TABLET | Refills: 0 | Status: SHIPPED | OUTPATIENT
Start: 2023-01-27 | End: 2023-02-18

## 2023-01-27 RX ORDER — DOXYCYCLINE HYCLATE 100 MG
100 TABLET ORAL 2 TIMES DAILY
Qty: 20 TABLET | Refills: 0 | Status: SHIPPED | OUTPATIENT
Start: 2023-01-27 | End: 2023-02-06

## 2023-01-27 RX ORDER — CLOTRIMAZOLE AND BETAMETHASONE DIPROPIONATE 10; .64 MG/G; MG/G
CREAM TOPICAL
Qty: 45 G | Refills: 0 | Status: SHIPPED | OUTPATIENT
Start: 2023-01-27

## 2023-01-27 ASSESSMENT — PATIENT HEALTH QUESTIONNAIRE - PHQ9
SUM OF ALL RESPONSES TO PHQ QUESTIONS 1-9: 0
SUM OF ALL RESPONSES TO PHQ QUESTIONS 1-9: 0
SUM OF ALL RESPONSES TO PHQ9 QUESTIONS 1 & 2: 0
1. LITTLE INTEREST OR PLEASURE IN DOING THINGS: 0
SUM OF ALL RESPONSES TO PHQ QUESTIONS 1-9: 0
SUM OF ALL RESPONSES TO PHQ QUESTIONS 1-9: 0
2. FEELING DOWN, DEPRESSED OR HOPELESS: 0

## 2023-01-27 NOTE — PROGRESS NOTES
[facility] [address] [facility] Note    Date: 1/27/2023    Assessment/Plan:     1. Rash  2. Essential hypertension    Rash concerning for tinea most likely vs nummular eczema  Low concern for erytehma migrans but with shared decision making will cover w/ doxy  Follow up if not improved  Lotrisone  Doxy   Diflucan   Cerave moisturizer cream/aquaphor/dermaphor- moisturize    Monitor BP, generally well controled, continue meds  No orders of the defined types were placed in this encounter. Orders Placed This Encounter   Medications    fluconazole (DIFLUCAN) 150 MG tablet     Sig: Take 1 tablet by mouth once a week for 4 doses     Dispense:  4 tablet     Refill:  0    doxycycline hyclate (VIBRA-TABS) 100 MG tablet     Sig: Take 1 tablet by mouth 2 times daily for 10 days     Dispense:  20 tablet     Refill:  0    clotrimazole-betamethasone (LOTRISONE) 1-0.05 % cream     Sig: Apply topically 2 times daily. Dispense:  45 g     Refill:  0       Return if symptoms worsen or fail to improve. Discussed medication(s) risks, benefits, side effects, adverse reactions and interactions with patient. Patient voiced understanding. Subjective/Objective:   HPI  Chief Complaint   Patient presents with    Rash     Day 9 right lower leg  Not itching  Walks in the woods w/ dog  Does have hx of resistant tinea to topical tx and needed diflucan weekly for 4 weeks prescribed by GI doctor - was jock itch  Hx of eczema as well lower ankle when had coral in there from a trip  Has tried lamisil for 8-9 days and did a little bit of moisturizer  Psychiatrist  Is prone to tinea infections b/c of other medical problems    HTN  Rx: hctz 25 mg po daily  Generally well controlled 120/80 per patient  Sx denies headache, vision changes, lightheadedness, chest pain, shortness of breath, orthopnea, PND, syncope.  Discussed DASH diet, exercise, weight loss       Wt Readings from Last 3 Encounters: 01/27/23 (!) 347 lb (157.4 kg)   12/14/22 (!) 332 lb (150.6 kg)   10/12/22 (!) 333 lb (151 kg)     Body mass index is 59.56 kg/m².     BP Readings from Last 3 Encounters:   01/27/23 (!) 142/92   12/14/22 121/74   10/12/22 132/84     The 10-year ASCVD risk score (Taj MEADE, et al., 2019) is: 7.3%    Values used to calculate the score:      Age: 50 years      Sex: Male      Is Non- : No      Diabetic: No      Tobacco smoker: No      Systolic Blood Pressure: 291 mmHg      Is BP treated: Yes      HDL Cholesterol: 37 mg/dL      Total Cholesterol: 213 mg/dL    See Assessment/Plan for further HPI info  ROS: denies nausea/vomiting, fevers, chills, chest pain, shortness of breath, diarrhea, constipation, blood in the urine or stool         Patient Active Problem List   Diagnosis    Hypogonadal obesity    Mixed hyperlipidemia    Lichen simplex chronicus    CHESTER on CPAP    Pituitary microadenoma (HCC)    Essential hypertension    Diverticulitis    Antibody deficiency syndrome (HCC)    Ankylosing spondylitis (HCC)    Partial nontraumatic tear of both rotator cuffs    Abdominal wall pain    Chronic bilateral low back pain without sciatica    HLA B27 positive    Headache    Immunosuppressed status (HCC)    Vitamin D deficiency    Elevated C-reactive protein (CRP)    Steatosis of liver    Meibomian gland dysfunction (MGD)     Past Medical History:   Diagnosis Date    Abdominal wall pain 04/19/2022    R external oblique tear    Anxiety     Asthma     Claustrophobia     Hypertension     Hypogonadal obesity 2010    Meibomian gland dysfunction (MGD)     Mixed hyperlipidemia 04/19/2022    6.9%    Persistent headaches     right temporal-started 3 weeks ago    Pituitary macroadenoma ([facility] [address] 03/2016    s/p surgery and managed by     Sleep apnea     uses c-pap, managed by Dr. Megan Rai at [facility]       Past Surgical History:   Procedure Laterality Date    [address]      gum transplant    PITUITARY SURGERY  03/2016    WISDOM TOOTH EXTRACTION         Current Outpatient Medications   Medication Sig Dispense Refill    fluconazole (DIFLUCAN) 150 MG tablet Take 1 tablet by mouth once a week for 4 doses 4 tablet 0    doxycycline hyclate (VIBRA-TABS) 100 MG tablet Take 1 tablet by mouth 2 times daily for 10 days 20 tablet 0    clotrimazole-betamethasone (LOTRISONE) 1-0.05 % cream Apply topically 2 times daily. 45 g 0    albuterol sulfate HFA (PROAIR HFA) 108 (90 Base) MCG/ACT inhaler INHALE TWO PUFFS INTO THE LUNGS EVERY 4 HOURS AS NEEDED FOR WHEEZING or shortness of breath 1 each 5    hydroCHLOROthiazide (HYDRODIURIL) 25 MG tablet Take 1 tablet by mouth daily 90 tablet 3    Adalimumab 20 MG/0.4ML PSKT 0.4 mLs      polyethylene glycol (MIRALAX) 17 g PACK packet Take 17 g by mouth daily      azelastine (ASTELIN) 0.1 % nasal spray 1 spray by Nasal route 2 times daily      cyclobenzaprine (FLEXERIL) 5 MG tablet Take 5 mg by mouth daily as needed      traMADol (ULTRAM) 50 MG tablet Take 50 mg by mouth every 6 hours as needed. docusate sodium (COLACE) 100 MG capsule Take 1 capsule by mouth 2 times daily 120 capsule 3    Levothyroxine Sodium 75 MCG CAPS       Cholecalciferol (VITAMIN D3) 48742 units CAPS Take by mouth once a week      clobetasol (TEMOVATE) 0.05 % ointment Apply sparingly to affected area(s) bid prn for flares, up to 2 weeks at a time. Do not apply on cleared skin. 60 g 0     No current facility-administered medications for this visit.      Allergies   Allergen Reactions    Adhesive Tape Dermatitis     Causes skin to get red    Dauna Lev Buff (Mag [Buffered Aspirin]      Nose bleeds     Lisinopril Other (See Comments)     cough    Aspirin Hives and Rash     nosebleeds  nosebleeds  nosebleeds  nosebleeds    nosebleeds    Other Dermatitis     Causes skin to get red       Social History     Socioeconomic History    Marital status:      Spouse name: Siva Bethea     Number of children: 0 Years of education: None    Highest education level: None   Occupational History    Occupation: psychiatrist   Tobacco Use    Smoking status: Former     Packs/day: 1.00     Years: 3.00     Pack years: 3.00     Types: Cigarettes     Quit date: 1990     Years since quittin.9    Smokeless tobacco: Never   Vaping Use    Vaping Use: Never used   Substance and Sexual Activity    Alcohol use: Yes     Alcohol/week: 0.0 standard drinks     Comment: social    Drug use: No    Sexual activity: Yes     Partners: Female     Social Determinants of Health     Financial Resource Strain: Low Risk     Difficulty of Paying Living Expenses: Not hard at all   Food Insecurity: No Food Insecurity    Worried About Running Out of Food in the Last Year: Never true    Ran Out of Food in the Last Year: Never true     Family History   Problem Relation Age of Onset    Breast Cancer Mother     Thyroid Disease Mother     Heart Disease Maternal Grandfather     Heart Attack Maternal Grandfather          Vitals:  BP (!) 142/92   Pulse 80   Ht 5' 4\" (1.626 m)   Wt (!) 347 lb (157.4 kg)   BMI 59.56 kg/m²     Physical Exam   General:  Well-appearing, no acute distress, alert, non-toxic  HEENT:  Normocephalic, atraumatic, without lymphadenopathy, EOMI, neck supple  Cardiovascular: normal heart rate, normal rhythm, no murmurs, rubs or gallops  Respiratory: normal breath sounds, good air movement, no respiratory distress, no wheezing, rales or rhonchi  GI: bowel sounds normal, soft, non-distended, no tenderness, no masses or peritoneal signs  Extremities: intact distal pulses, warm, dry, well perfused, without clubbing, cyanosis or edema, normal movement of all extremities. No joint swelling, deformity or tenderness.   Skin:  No rash other than below, warm and dry  PSYCH:  alert and oriented x 3; normal affect  NEURO:  cranial nerves intact/exam non focal, normal motor function, normal sensory function, normal speech, no gross focal deficits noted, gait within normal  Maculopapular annular rash with central clearing right lower leg see media tab    30 Total Minutes spent pre charting (reviewing problem list, meds, any test results, consultant and hospital notes, health maintenance reviewed with patient) and  obtaining present visit history, performing appropriate medical exam/evaluation, counseling and educating the patient (and family), ordering medications ,tests, and procedures as needed, refilling medication(s), placing referral(s) when needed in addition to coordinating care for this patient and documenting in electronic health record      Julio Granados MD    1/27/2023 1:24 PM    Documentation was done using voice recognition dragon software. Every effort was made to ensure accuracy; however, inadvertent, unintentional computerized transcription errors may be present.

## 2023-03-01 ENCOUNTER — TELEPHONE (OUTPATIENT)
Dept: FAMILY MEDICINE CLINIC | Age: 53
End: 2023-03-01

## 2023-03-15 ENCOUNTER — OFFICE VISIT (OUTPATIENT)
Dept: FAMILY MEDICINE CLINIC | Age: 53
End: 2023-03-15
Payer: COMMERCIAL

## 2023-03-15 VITALS
BODY MASS INDEX: 53.78 KG/M2 | RESPIRATION RATE: 22 BRPM | HEIGHT: 64 IN | SYSTOLIC BLOOD PRESSURE: 138 MMHG | DIASTOLIC BLOOD PRESSURE: 72 MMHG | WEIGHT: 315 LBS | TEMPERATURE: 97.5 F | OXYGEN SATURATION: 97 % | HEART RATE: 78 BPM

## 2023-03-15 DIAGNOSIS — H02.889 MEIBOMIAN GLAND DYSFUNCTION (MGD): ICD-10-CM

## 2023-03-15 DIAGNOSIS — M45.0 ANKYLOSING SPONDYLITIS OF MULTIPLE SITES IN SPINE (HCC): ICD-10-CM

## 2023-03-15 DIAGNOSIS — D80.6 ANTIBODY DEFICIENCY SYNDROME (HCC): ICD-10-CM

## 2023-03-15 DIAGNOSIS — D35.2 PITUITARY MICROADENOMA (HCC): Primary | ICD-10-CM

## 2023-03-15 DIAGNOSIS — Z23 NEED FOR VACCINATION: ICD-10-CM

## 2023-03-15 DIAGNOSIS — R21 RASH: ICD-10-CM

## 2023-03-15 PROCEDURE — 3074F SYST BP LT 130 MM HG: CPT | Performed by: NURSE PRACTITIONER

## 2023-03-15 PROCEDURE — 90715 TDAP VACCINE 7 YRS/> IM: CPT | Performed by: NURSE PRACTITIONER

## 2023-03-15 PROCEDURE — 90471 IMMUNIZATION ADMIN: CPT | Performed by: NURSE PRACTITIONER

## 2023-03-15 PROCEDURE — 3078F DIAST BP <80 MM HG: CPT | Performed by: NURSE PRACTITIONER

## 2023-03-15 PROCEDURE — 99214 OFFICE O/P EST MOD 30 MIN: CPT | Performed by: NURSE PRACTITIONER

## 2023-03-15 SDOH — ECONOMIC STABILITY: HOUSING INSECURITY
IN THE LAST 12 MONTHS, WAS THERE A TIME WHEN YOU DID NOT HAVE A STEADY PLACE TO SLEEP OR SLEPT IN A SHELTER (INCLUDING NOW)?: NO

## 2023-03-15 SDOH — ECONOMIC STABILITY: FOOD INSECURITY: WITHIN THE PAST 12 MONTHS, THE FOOD YOU BOUGHT JUST DIDN'T LAST AND YOU DIDN'T HAVE MONEY TO GET MORE.: NEVER TRUE

## 2023-03-15 SDOH — ECONOMIC STABILITY: INCOME INSECURITY: HOW HARD IS IT FOR YOU TO PAY FOR THE VERY BASICS LIKE FOOD, HOUSING, MEDICAL CARE, AND HEATING?: NOT HARD AT ALL

## 2023-03-15 SDOH — ECONOMIC STABILITY: FOOD INSECURITY: WITHIN THE PAST 12 MONTHS, YOU WORRIED THAT YOUR FOOD WOULD RUN OUT BEFORE YOU GOT MONEY TO BUY MORE.: NEVER TRUE

## 2023-03-15 ASSESSMENT — PATIENT HEALTH QUESTIONNAIRE - PHQ9
SUM OF ALL RESPONSES TO PHQ9 QUESTIONS 1 & 2: 1
2. FEELING DOWN, DEPRESSED OR HOPELESS: 0
1. LITTLE INTEREST OR PLEASURE IN DOING THINGS: 1
SUM OF ALL RESPONSES TO PHQ QUESTIONS 1-9: 1

## 2023-03-15 NOTE — PATIENT INSTRUCTIONS
[facility] - Karla Patel MD  [address]   [address], [address]   Tel: [phone]

## 2023-03-15 NOTE — PROGRESS NOTES
3/15/2023     Mukund Anderson (:  1970) is a 50 y.o. male, here for evaluation of the following medical concerns:    Chief Complaint   Patient presents with    Medication Refill     Patient arrives today for follow up on multiple health concerns. Lymphnode:  Ultrasound is scheduled for lymphnode in right clavicular area next month. Is concerned it has enlarged. This is being followed by ENT. Ankylosing Spondylitis:  is currently off of Humira. Is taking a two month break before starting on Simponi. This is a monthly injections. This is managed by Rheumatology at [facility].  He is currently on disability. He is seeing [facility] for Prolo therapy D5/D10 injections to work on decreasing the inflammation. Struggles to dress self,, can not type for long periods. He is also unable to wipe after using bathroom but has a bidet to help clean the area. He is able to shower himself but does have rotator cuff limitations with showering. Ok to feed self. He does use Tramadol PRN for acute pain. MGD still an issue, he has scheduled for his procedure for 23 with Nadeen Horton. Antibody Deficiency:  Managed by specialist.  He is going to check with his provider to see if it is ok to obtain the Shingrix vaccine. He would like to get the Tdap today due to him being off of his TNF medication. Pituitary Microadenoma:  managed by neurosurg at . Undergoes an annual scan. All recent scans have been normal.    Review of Systems   Constitutional:  Negative for chills, diaphoresis, fatigue and fever. Respiratory:  Negative for cough, chest tightness, shortness of breath and wheezing. Cardiovascular:  Negative for chest pain and palpitations. Gastrointestinal: Negative. Genitourinary: Negative. Musculoskeletal:  Positive for arthralgias, back pain and myalgias. Neurological:  Negative for dizziness, weakness and headaches.      Prior to Visit Medications    Medication Sig Taking? Authorizing Provider   traMADol (ULTRAM) 50 MG tablet Take 1 tablet by mouth every 4 hours as needed for Pain for up to 7 days. Intended supply: 7 days. Take lowest dose possible to manage pain Max Daily Amount: 300 mg Yes MANUEL Archibald CNP   clotrimazole-betamethasone (LOTRISONE) 1-0.05 % cream Apply topically 2 times daily. Yes Fracisco Salomon MD   albuterol sulfate HFA (PROAIR HFA) 108 (90 Base) MCG/ACT inhaler INHALE TWO PUFFS INTO THE LUNGS EVERY 4 HOURS AS NEEDED FOR WHEEZING or shortness of breath Yes Kylee Issa MD   hydroCHLOROthiazide (HYDRODIURIL) 25 MG tablet Take 1 tablet by mouth daily Yes MANUEL Archibald CNP   Adalimumab 20 MG/0.4ML PSKT 0.4 mLs Yes Historical Provider, MD   polyethylene glycol (MIRALAX) 17 g PACK packet Take 17 g by mouth daily Yes Historical Provider, MD   azelastine (ASTELIN) 0.1 % nasal spray 1 spray by Nasal route 2 times daily Yes Historical Provider, MD   cyclobenzaprine (FLEXERIL) 5 MG tablet Take 5 mg by mouth daily as needed Yes Historical Provider, MD   docusate sodium (COLACE) 100 MG capsule Take 1 capsule by mouth 2 times daily Yes Natalee Cummings MD   Levothyroxine Sodium 75 MCG CAPS  Yes Historical Provider, MD   Cholecalciferol (VITAMIN D3) 50671 units CAPS Take by mouth once a week Yes Historical Provider, MD   clobetasol (TEMOVATE) 0.05 % ointment Apply sparingly to affected area(s) bid prn for flares, up to 2 weeks at a time. Do not apply on cleared skin. Yes Chad Hwang MD      Social History     Tobacco Use    Smoking status: Former     Packs/day: 1.00     Years: 3.00     Pack years: 3.00     Types: Cigarettes     Quit date: 1990     Years since quittin.1    Smokeless tobacco: Never   Vaping Use    Vaping Use: Never used   Substance Use Topics    Alcohol use:  Yes     Alcohol/week: 0.0 standard drinks     Comment: social    Drug use: No      Vitals:    03/15/23 1311 03/15/23 1349   BP: (!) 140/88 138/72   Site:  Left Upper Arm Position:  Sitting   Cuff Size:  Large Adult   Pulse: 78    Resp: 22    Temp: 97.5 °F (36.4 °C)    TempSrc: Oral    SpO2: 97%    Weight: (!) 349 lb (158.3 kg)    Height: 5' 4\" (1.626 m)      Estimated body mass index is 59.91 kg/m² as calculated from the following:    Height as of this encounter: 5' 4\" (1.626 m). Weight as of this encounter: 349 lb (158.3 kg). Physical Exam  Vitals and nursing note reviewed. Constitutional:       General: He is awake. He is not in acute distress. Appearance: Normal appearance. He is well-developed. He is morbidly obese. He is not ill-appearing. Cardiovascular:      Rate and Rhythm: Normal rate and regular rhythm. Heart sounds: Normal heart sounds, S1 normal and S2 normal. No murmur heard. Pulmonary:      Effort: Pulmonary effort is normal.      Breath sounds: Normal breath sounds and air entry. No decreased breath sounds, wheezing, rhonchi or rales. Musculoskeletal:      Right lower leg: No edema. Left lower leg: No edema. Lymphadenopathy:      Comments: Enlarged right clavicular lymphnode noted again, mobile, soft, does appear larger than last exam, approximate size quarter. Skin:     General: Skin is warm and dry. Capillary Refill: Capillary refill takes less than 2 seconds. Neurological:      General: No focal deficit present. Mental Status: He is alert. Psychiatric:         Mood and Affect: Mood normal.         Behavior: Behavior is cooperative. ASSESSMENT/PLAN:  1. Pituitary microadenoma ([address]  Resolved - annual scanning/management done by [facility]    2. Ankylosing spondylitis of multiple sites in spine (HCC)  Unstable  Off of TNF medication currently. No inconsistencies with [place]ARRS. Medication initiated after consulting with collaborating physician. - traMADol (ULTRAM) 50 MG tablet; Take 1 tablet by mouth every 4 hours as needed for Pain for up to 7 days. Intended supply: 7 days.  Take lowest dose possible to manage pain Max Daily Amount: 300 mg  Dispense: 42 tablet; Refill: 0    3. Antibody deficiency syndrome ([address]  Stable - managed by specialist    4. Sacha Green MD, Dermatology, [facility]    5. Need for vaccination  Given today  - Tdap, ADACEL, (age 10y-63y), IM    6. Meibomian gland dysfunction (MGD)  Unstable  Has procedure planned for later this month by Dr. Sebastian Foy    7. Body mass index (BMI) 50.0-59.9, adult (HCC)  Unstable  Recommended at least 30 minutes of aerobic exercise daily. Strongly recommend eliminating concentrated sweets, reducing simple carbs. Avoid corn syrup and hydrogenated oils. Focus on fruits, vegs, whole grains, lean meats and push water. Fish oil, high fiber foods recommended. Time spent: 45 minutes, >50% of which was spent face to face with patient discussing HPI/plan/reviewing records and coordinating care    Return in about 3 months (around 6/15/2023), or if symptoms worsen or fail to improve.

## 2023-03-16 RX ORDER — TRAMADOL HYDROCHLORIDE 50 MG/1
50 TABLET ORAL EVERY 4 HOURS PRN
Qty: 42 TABLET | Refills: 0 | Status: SHIPPED | OUTPATIENT
Start: 2023-03-16 | End: 2023-03-23

## 2023-03-16 ASSESSMENT — ENCOUNTER SYMPTOMS
COUGH: 0
WHEEZING: 0
CHEST TIGHTNESS: 0
GASTROINTESTINAL NEGATIVE: 1
BACK PAIN: 1
SHORTNESS OF BREATH: 0

## 2023-04-03 ENCOUNTER — HOSPITAL ENCOUNTER (OUTPATIENT)
Dept: ULTRASOUND IMAGING | Age: 53
Discharge: HOME | End: 2023-04-03
Payer: COMMERCIAL

## 2023-04-03 DIAGNOSIS — R59.9 ENLARGED LYMPH NODE: ICD-10-CM

## 2023-04-03 PROCEDURE — 76999 ECHO EXAMINATION PROCEDURE: CPT

## 2023-05-25 ENCOUNTER — OFFICE VISIT (OUTPATIENT)
Dept: FAMILY MEDICINE CLINIC | Age: 53
End: 2023-05-25
Payer: COMMERCIAL

## 2023-05-25 VITALS
HEART RATE: 73 BPM | BODY MASS INDEX: 53.78 KG/M2 | WEIGHT: 315 LBS | HEIGHT: 64 IN | RESPIRATION RATE: 20 BRPM | SYSTOLIC BLOOD PRESSURE: 138 MMHG | DIASTOLIC BLOOD PRESSURE: 86 MMHG | OXYGEN SATURATION: 98 %

## 2023-05-25 DIAGNOSIS — I10 ESSENTIAL HYPERTENSION: ICD-10-CM

## 2023-05-25 DIAGNOSIS — H02.889 MEIBOMIAN GLAND DYSFUNCTION (MGD): ICD-10-CM

## 2023-05-25 DIAGNOSIS — M45.0 ANKYLOSING SPONDYLITIS OF MULTIPLE SITES IN SPINE (HCC): Primary | ICD-10-CM

## 2023-05-25 PROCEDURE — 99214 OFFICE O/P EST MOD 30 MIN: CPT | Performed by: NURSE PRACTITIONER

## 2023-05-25 PROCEDURE — 3075F SYST BP GE 130 - 139MM HG: CPT | Performed by: NURSE PRACTITIONER

## 2023-05-25 PROCEDURE — 3079F DIAST BP 80-89 MM HG: CPT | Performed by: NURSE PRACTITIONER

## 2023-05-25 RX ORDER — GOLIMUMAB 50 MG/.5ML
INJECTION, SOLUTION SUBCUTANEOUS
COMMUNITY
Start: 2023-02-27

## 2023-05-25 ASSESSMENT — ENCOUNTER SYMPTOMS
COUGH: 0
GASTROINTESTINAL NEGATIVE: 1
WHEEZING: 0
CHEST TIGHTNESS: 0
SHORTNESS OF BREATH: 0

## 2023-05-25 ASSESSMENT — PATIENT HEALTH QUESTIONNAIRE - PHQ9
SUM OF ALL RESPONSES TO PHQ QUESTIONS 1-9: 0
SUM OF ALL RESPONSES TO PHQ QUESTIONS 1-9: 0
2. FEELING DOWN, DEPRESSED OR HOPELESS: 0
SUM OF ALL RESPONSES TO PHQ9 QUESTIONS 1 & 2: 0
SUM OF ALL RESPONSES TO PHQ QUESTIONS 1-9: 0
1. LITTLE INTEREST OR PLEASURE IN DOING THINGS: 0
SUM OF ALL RESPONSES TO PHQ QUESTIONS 1-9: 0

## 2023-05-25 NOTE — PATIENT INSTRUCTIONS
[address] [place], [place] [address]f [address]  [address]  [place], [address] [place] [address]  [phone]

## 2023-05-25 NOTE — PROGRESS NOTES
packet Take 17 g by mouth daily Yes Historical Provider, MD   azelastine (ASTELIN) 0.1 % nasal spray 1 spray by Nasal route 2 times daily Yes Historical Provider, MD   cyclobenzaprine (FLEXERIL) 5 MG tablet Take 1 tablet by mouth daily as needed Yes Historical Provider, MD   docusate sodium (COLACE) 100 MG capsule Take 1 capsule by mouth 2 times daily Yes Faby Sevilla MD   Levothyroxine Sodium 75 MCG CAPS  Yes Historical Provider, MD   Cholecalciferol (VITAMIN D3) 98466 units CAPS Take by mouth once a week Yes Historical Provider, MD   clobetasol (TEMOVATE) 0.05 % ointment Apply sparingly to affected area(s) bid prn for flares, up to 2 weeks at a time. Do not apply on cleared skin. Yes Clarissa Oconnor MD      Social History     Tobacco Use    Smoking status: Former     Packs/day: 1.00     Years: 3.00     Pack years: 3.00     Types: Cigarettes     Quit date: 1990     Years since quittin.3    Smokeless tobacco: Never   Vaping Use    Vaping Use: Never used   Substance Use Topics    Alcohol use: Yes     Alcohol/week: 0.0 standard drinks     Comment: social    Drug use: No      Vitals:    23 1343   BP: 138/86   Site: Left Lower Arm   Position: Sitting   Cuff Size: Large Adult   Pulse: 73   Resp: 20   SpO2: 98%   Weight: (!) 350 lb (158.8 kg)   Height: 5' 4\" (1.626 m)     Estimated body mass index is 60.08 kg/m² as calculated from the following:    Height as of this encounter: 5' 4\" (1.626 m). Weight as of this encounter: 350 lb (158.8 kg). Physical Exam  Vitals and nursing note reviewed. Constitutional:       General: He is awake. He is not in acute distress. Appearance: Normal appearance. He is well-developed. He is morbidly obese. He is not ill-appearing, toxic-appearing or diaphoretic. Cardiovascular:      Rate and Rhythm: Normal rate and regular rhythm. Heart sounds: Normal heart sounds, S1 normal and S2 normal. No murmur heard.   Pulmonary:      Effort: Pulmonary effort is normal.

## 2023-07-03 DIAGNOSIS — I10 ESSENTIAL HYPERTENSION: ICD-10-CM

## 2023-07-03 RX ORDER — HYDROCHLOROTHIAZIDE 25 MG/1
25 TABLET ORAL DAILY
Qty: 90 TABLET | Refills: 0 | Status: SHIPPED | OUTPATIENT
Start: 2023-07-03 | End: 2023-08-23

## 2023-08-23 ENCOUNTER — OFFICE VISIT (OUTPATIENT)
Dept: FAMILY MEDICINE CLINIC | Age: 53
End: 2023-08-23
Payer: COMMERCIAL

## 2023-08-23 VITALS
WEIGHT: 315 LBS | DIASTOLIC BLOOD PRESSURE: 85 MMHG | OXYGEN SATURATION: 99 % | HEART RATE: 87 BPM | BODY MASS INDEX: 53.78 KG/M2 | HEIGHT: 64 IN | SYSTOLIC BLOOD PRESSURE: 123 MMHG | RESPIRATION RATE: 20 BRPM

## 2023-08-23 DIAGNOSIS — F41.9 ANXIETY: ICD-10-CM

## 2023-08-23 DIAGNOSIS — I10 ESSENTIAL HYPERTENSION: Primary | ICD-10-CM

## 2023-08-23 DIAGNOSIS — R21 RASH: ICD-10-CM

## 2023-08-23 PROCEDURE — 3074F SYST BP LT 130 MM HG: CPT | Performed by: NURSE PRACTITIONER

## 2023-08-23 PROCEDURE — 99214 OFFICE O/P EST MOD 30 MIN: CPT | Performed by: NURSE PRACTITIONER

## 2023-08-23 PROCEDURE — 3079F DIAST BP 80-89 MM HG: CPT | Performed by: NURSE PRACTITIONER

## 2023-08-23 RX ORDER — ROSUVASTATIN CALCIUM 10 MG/1
TABLET, COATED ORAL
COMMUNITY
Start: 2023-06-15

## 2023-08-23 RX ORDER — ALPRAZOLAM 0.5 MG/1
0.5 TABLET ORAL NIGHTLY PRN
Qty: 10 TABLET | Refills: 0 | Status: SHIPPED | OUTPATIENT
Start: 2023-08-23 | End: 2023-09-02

## 2023-08-23 RX ORDER — LOSARTAN POTASSIUM 25 MG/1
25 TABLET ORAL DAILY
Qty: 90 TABLET | Refills: 1 | Status: SHIPPED | OUTPATIENT
Start: 2023-08-23

## 2023-08-23 ASSESSMENT — PATIENT HEALTH QUESTIONNAIRE - PHQ9
SUM OF ALL RESPONSES TO PHQ QUESTIONS 1-9: 1
SUM OF ALL RESPONSES TO PHQ9 QUESTIONS 1 & 2: 1
SUM OF ALL RESPONSES TO PHQ QUESTIONS 1-9: 1
SUM OF ALL RESPONSES TO PHQ QUESTIONS 1-9: 1
1. LITTLE INTEREST OR PLEASURE IN DOING THINGS: 1
SUM OF ALL RESPONSES TO PHQ QUESTIONS 1-9: 1
2. FEELING DOWN, DEPRESSED OR HOPELESS: 0

## 2023-08-23 ASSESSMENT — ENCOUNTER SYMPTOMS
CHEST TIGHTNESS: 0
COUGH: 0
SHORTNESS OF BREATH: 0
GASTROINTESTINAL NEGATIVE: 1
WHEEZING: 0

## 2023-08-23 NOTE — PATIENT INSTRUCTIONS
[address] [place] [address]  [address]  [place], [address]  [phone]    [facility], [address] [facility], [address] [place] [address]  [phone]

## 2023-08-23 NOTE — PROGRESS NOTES
2023     Cherie Stanley (:  1970) is a 48 y.o. male, here for evaluation of the following medical concerns:    Chief Complaint   Patient presents with    Hypertension     Follow up       Anxiety:  Has had issues with anxiety in his past when needing testing and claustrophobia. Will usually use Alprazolam sparingly as needed. Usually uses 10 tablets per year. His GI provider talked to him about taking Monjaro for weight loss. Obesity:  Doing Paleo diet, has lost 23 pounds. Wt Readings from Last 3 Encounters:   23 (!) 327 lb (148.3 kg)   23 (!) 350 lb (158.8 kg)   03/15/23 (!) 349 lb (158.3 kg)     PM&R:  he is having spasms in his lower back. He is planning to do a lumbar MRI. He is having some decreased strength in left arm, will be ordering arm MRI. Believes this is from systemic inflammation. Got an acromium steroid injection in the left shoulder. Eye disease has improved after procedure. Hypertension:  Home blood pressure monitoring: No.  He is adherent to a low sodium diet. Patient denies chest pain, shortness of breath, headache, lightheadedness, blurred vision, peripheral edema, palpitations, dry cough, and fatigue. Antihypertensive medication side effects: no medication side effects noted. Use of agents associated with hypertension: none. Sodium (mmol/L)   Date Value   2021 144    BUN (mg/dL)   Date Value   2021 8    Glucose (mg/dL)   Date Value   2021 83      Potassium (mmol/L)   Date Value   2021 3.9    Creatinine (mg/dL)   Date Value   2021 0.7 (L)         Review of Systems   Constitutional:  Negative for chills, diaphoresis, fatigue and fever. Respiratory:  Negative for cough, chest tightness, shortness of breath and wheezing. Cardiovascular:  Negative for chest pain and palpitations. Gastrointestinal: Negative. Genitourinary: Negative.     Neurological:  Negative

## 2023-09-19 ENCOUNTER — PATIENT MESSAGE (OUTPATIENT)
Dept: FAMILY MEDICINE CLINIC | Age: 53
End: 2023-09-19

## 2023-09-19 ENCOUNTER — TELEPHONE (OUTPATIENT)
Dept: FAMILY MEDICINE CLINIC | Age: 53
End: 2023-09-19

## 2023-09-19 DIAGNOSIS — R39.9 UTI SYMPTOMS: Primary | ICD-10-CM

## 2023-09-19 NOTE — TELEPHONE ENCOUNTER
Patient spouse called and said that her  did a online visit for a UTI and would like for the provider to put in a script to [facility] for a UA and culture. Please call [phone] once it has been placed.

## 2023-09-19 NOTE — TELEPHONE ENCOUNTER
From: Imelda Eng  To: Audrey Bhatt  Sent: 9/19/2023 10:44 AM EDT  Subject: UA and Cx request    I had dysuria with no discharge or fever. No cva tenderness. No STI risk. Did private televisit due to hour and got Rx for cefurixme called in. Started last night. I saved a clean catch pre abx dose urine in a sterile container and placed in fridge. I believe we have 24 hours to get a dip and cx from this sample in case abx resistance or no response to this first abx. Can you send a UA and Cx order to [facility] in [address] [place] [address] with phone [phone] and fax [phone]. we can drive the sample there today. alternatively if you would prefer we can bring the sample to your office if you want to collect and send the dip and cx from there. Thanks.

## 2023-10-01 DIAGNOSIS — I10 ESSENTIAL HYPERTENSION: ICD-10-CM

## 2023-10-02 RX ORDER — HYDROCHLOROTHIAZIDE 25 MG/1
25 TABLET ORAL DAILY
Qty: 90 TABLET | Refills: 0 | OUTPATIENT
Start: 2023-10-02

## 2023-10-02 NOTE — TELEPHONE ENCOUNTER
Medication:   Requested Prescriptions     Pending Prescriptions Disp Refills    hydroCHLOROthiazide (HYDRODIURIL) 25 MG tablet [Pharmacy Med Name: HYDROCHLOROTHIAZIDE 25MG TABLETS] 90 tablet 0     Sig: TAKE 1 TABLET BY MOUTH DAILY        Last Filled:  90 x 0 RF 7/3/23    Patient Phone Number: [phone] (home)     Last appt: 8/23/2023   Next appt: 11/22/2023    Last OARRS:       3/15/2021     1:09 PM   RX Monitoring   Periodic Controlled Substance Monitoring No signs of potential drug abuse or diversion identified.

## 2023-10-18 ENCOUNTER — OFFICE VISIT (OUTPATIENT)
Dept: PULMONOLOGY | Age: 53
End: 2023-10-18
Payer: COMMERCIAL

## 2023-10-18 VITALS
HEIGHT: 64 IN | HEART RATE: 78 BPM | DIASTOLIC BLOOD PRESSURE: 88 MMHG | RESPIRATION RATE: 20 BRPM | WEIGHT: 315 LBS | SYSTOLIC BLOOD PRESSURE: 122 MMHG | OXYGEN SATURATION: 97 % | BODY MASS INDEX: 53.78 KG/M2

## 2023-10-18 DIAGNOSIS — G47.33 OSA ON CPAP: Primary | ICD-10-CM

## 2023-10-18 DIAGNOSIS — J45.20 MILD INTERMITTENT ASTHMA WITHOUT COMPLICATION: ICD-10-CM

## 2023-10-18 PROCEDURE — 99214 OFFICE O/P EST MOD 30 MIN: CPT | Performed by: INTERNAL MEDICINE

## 2023-10-18 PROCEDURE — 3079F DIAST BP 80-89 MM HG: CPT | Performed by: INTERNAL MEDICINE

## 2023-10-18 PROCEDURE — 3074F SYST BP LT 130 MM HG: CPT | Performed by: INTERNAL MEDICINE

## 2023-10-18 RX ORDER — ALBUTEROL SULFATE 90 UG/1
AEROSOL, METERED RESPIRATORY (INHALATION)
Qty: 1 EACH | Refills: 5 | Status: SHIPPED | OUTPATIENT
Start: 2023-10-18

## 2023-10-18 ASSESSMENT — SLEEP AND FATIGUE QUESTIONNAIRES
HOW LIKELY ARE YOU TO NOD OFF OR FALL ASLEEP WHILE LYING DOWN TO REST IN THE AFTERNOON WHEN CIRCUMSTANCES PERMIT: 2
NECK CIRCUMFERENCE (INCHES): 19.25
ESS TOTAL SCORE: 5
HOW LIKELY ARE YOU TO NOD OFF OR FALL ASLEEP WHILE SITTING AND READING: 1
HOW LIKELY ARE YOU TO NOD OFF OR FALL ASLEEP WHILE WATCHING TV: 1
HOW LIKELY ARE YOU TO NOD OFF OR FALL ASLEEP WHILE SITTING INACTIVE IN A PUBLIC PLACE: 0
HOW LIKELY ARE YOU TO NOD OFF OR FALL ASLEEP WHILE SITTING AND TALKING TO SOMEONE: 0
HOW LIKELY ARE YOU TO NOD OFF OR FALL ASLEEP WHEN YOU ARE A PASSENGER IN A CAR FOR AN HOUR WITHOUT A BREAK: 1
HOW LIKELY ARE YOU TO NOD OFF OR FALL ASLEEP IN A CAR, WHILE STOPPED FOR A FEW MINUTES IN TRAFFIC: 0
HOW LIKELY ARE YOU TO NOD OFF OR FALL ASLEEP WHILE SITTING QUIETLY AFTER LUNCH WITHOUT ALCOHOL: 0

## 2023-10-18 NOTE — PROGRESS NOTES
windows demonstrate that the central airways are patent. The   lungs are clear, without evidence of acute airspace consolidation. There is no evidence of a pneumothorax or pleural effusion. There   is a calcified granuloma within the right lower lobe. No   suspicious pulmonary nodule or mass is evident. Mediastinal windows demonstrate no pathologic lymphadenopathy. The   heart, pericardium, and intrathoracic great vessels are   unremarkable in noncontrast appearance. Incidental note is made of   bilateral gynecomastia. Limited images of the upper abdomen demonstrate that the adrenal   glands are normal. The remainder of the upper abdomen is   unremarkable. Bone windows demonstrate mild degenerative change   throughout the spine without evidence of an osteolytic or   osteoblastic lesion. Impression   IMPRESSION:       1. No acute intrapulmonary findings. 2. Calcified right lower lobe granuloma, benign. 3. Bilateral gynecomastia. Assessment:  Severe Obstructive Sleep Apnea - AHI down to 1 from 68 with APAP  Mild intermittent asthma     Not addressed today:  H/O ankylosing spondylitis   H/O specific antibody deficiency     Plan:   Albuterol PRN  APAP 10 to 14   No driving while sleepy; weight loss recommended; systemic benefits of CPAP therapy have been discussed. CPAP supplies with [employer]   F/U in one year.       Patient is MD Psychiatry

## 2023-11-30 ENCOUNTER — OFFICE VISIT (OUTPATIENT)
Dept: FAMILY MEDICINE CLINIC | Age: 53
End: 2023-11-30
Payer: COMMERCIAL

## 2023-11-30 VITALS
OXYGEN SATURATION: 97 % | HEART RATE: 86 BPM | DIASTOLIC BLOOD PRESSURE: 78 MMHG | HEIGHT: 64 IN | TEMPERATURE: 98.2 F | SYSTOLIC BLOOD PRESSURE: 136 MMHG | WEIGHT: 308 LBS | RESPIRATION RATE: 16 BRPM | BODY MASS INDEX: 52.58 KG/M2

## 2023-11-30 DIAGNOSIS — E66.01 CLASS 3 SEVERE OBESITY DUE TO EXCESS CALORIES WITH SERIOUS COMORBIDITY AND BODY MASS INDEX (BMI) OF 50.0 TO 59.9 IN ADULT (HCC): ICD-10-CM

## 2023-11-30 DIAGNOSIS — J30.89 NON-SEASONAL ALLERGIC RHINITIS, UNSPECIFIED TRIGGER: ICD-10-CM

## 2023-11-30 DIAGNOSIS — I10 ESSENTIAL HYPERTENSION: Primary | ICD-10-CM

## 2023-11-30 PROCEDURE — 3075F SYST BP GE 130 - 139MM HG: CPT | Performed by: NURSE PRACTITIONER

## 2023-11-30 PROCEDURE — 3078F DIAST BP <80 MM HG: CPT | Performed by: NURSE PRACTITIONER

## 2023-11-30 PROCEDURE — 99214 OFFICE O/P EST MOD 30 MIN: CPT | Performed by: NURSE PRACTITIONER

## 2023-11-30 RX ORDER — AZELASTINE 1 MG/ML
1 SPRAY, METERED NASAL 2 TIMES DAILY
Qty: 30 ML | Refills: 3 | Status: SHIPPED | OUTPATIENT
Start: 2023-11-30

## 2023-11-30 ASSESSMENT — PATIENT HEALTH QUESTIONNAIRE - PHQ9
SUM OF ALL RESPONSES TO PHQ QUESTIONS 1-9: 2
SUM OF ALL RESPONSES TO PHQ9 QUESTIONS 1 & 2: 2
1. LITTLE INTEREST OR PLEASURE IN DOING THINGS: 1
2. FEELING DOWN, DEPRESSED OR HOPELESS: 1
SUM OF ALL RESPONSES TO PHQ QUESTIONS 1-9: 2

## 2023-11-30 ASSESSMENT — ENCOUNTER SYMPTOMS
CHEST TIGHTNESS: 0
GASTROINTESTINAL NEGATIVE: 1
SHORTNESS OF BREATH: 0
WHEEZING: 0
COUGH: 0

## 2023-11-30 NOTE — PROGRESS NOTES
distress. Appearance: Normal appearance. He is well-developed. He is morbidly obese. He is not ill-appearing, toxic-appearing or diaphoretic. Cardiovascular:      Rate and Rhythm: Normal rate and regular rhythm. Heart sounds: Normal heart sounds, S1 normal and S2 normal. No murmur heard. Pulmonary:      Effort: Pulmonary effort is normal.      Breath sounds: Normal breath sounds and air entry. Skin:     General: Skin is warm and dry. Capillary Refill: Capillary refill takes less than 2 seconds. Neurological:      General: No focal deficit present. Mental Status: He is alert. Psychiatric:         Mood and Affect: Mood normal.         Behavior: Behavior is cooperative. ASSESSMENT/PLAN:  1. Essential hypertension  Stable, doing well  90 pound weight loss  Will have labs done at [facility] and have sent to me  - Hemoglobin A1C; Future  - Comprehensive Metabolic Panel, Fasting; Future  - CBC with Auto Differential; Future  - Lipid, Fasting; Future  - TSH with Reflex to FT4; Future  - Vitamin D 25 Hydroxy; Future    2. Non-seasonal allergic rhinitis, unspecified trigger  - azelastine (ASTELIN) 0.1 % nasal spray; 1 spray by Nasal route 2 times daily  Dispense: 30 mL; Refill: 3    3. Class 3 severe obesity due to excess calories with serious comorbidity and body mass index (BMI) of 50.0 to 59.9 in adult [facility])  Has had 90 pound weight loss with paleo diet  Doing very well    Return in about 3 months (around 2/29/2024), or if symptoms worsen or fail to improve.

## 2023-12-07 LAB
ALBUMIN SERPL-MCNC: 4.5 G/DL (ref 3.5–5.7)
ALP BLD-CCNC: 57 U/L (ref 36–125)
ALT SERPL-CCNC: 22 U/L (ref 7–52)
ANION GAP SERPL CALCULATED.3IONS-SCNC: 12 MMOL/L (ref 3–16)
AST SERPL-CCNC: 14 U/L (ref 13–39)
BASOPHILS ABSOLUTE: 69 /UL (ref 0–200)
BASOPHILS RELATIVE PERCENT: 0.8 % (ref 0–1)
BILIRUB SERPL-MCNC: 0.4 MG/DL (ref 0–1.5)
BUN BLDV-MCNC: 14 MG/DL (ref 7–25)
CALCIUM SERPL-MCNC: 9.6 MG/DL (ref 8.6–10.3)
CHLORIDE BLD-SCNC: 102 MMOL/L (ref 98–110)
CO2: 28 MMOL/L (ref 21–33)
CREAT SERPL-MCNC: 0.83 MG/DL (ref 0.6–1.3)
EOSINOPHILS ABSOLUTE: 146 /UL (ref 15–500)
EOSINOPHILS RELATIVE PERCENT: 1.7 % (ref 0–8)
ESTIMATED GLOMERULAR FILTRATION RATE CREATININE EQUATION: >90
GLUCOSE BLD-MCNC: 80 MG/DL (ref 70–100)
HCT VFR BLD CALC: 48.5 % (ref 38.5–50)
HEMOGLOBIN: 15.9 G/DL (ref 13.2–17.1)
LYMPHOCYTES ABSOLUTE: 2202 /UL (ref 850–3900)
LYMPHOCYTES RELATIVE PERCENT: 25.6 % (ref 15–45)
MCH RBC QN AUTO: 28.2 PG (ref 27–33)
MCHC RBC AUTO-ENTMCNC: 32.8 G/DL (ref 32–36)
MCV RBC AUTO: 86 FL (ref 80–100)
MONOCYTES ABSOLUTE: 688 /UL (ref 200–950)
MONOCYTES RELATIVE PERCENT: 8 % (ref 0–12)
NEUTROPHILS ABSOLUTE: 5495 /UL (ref 1500–7800)
NUCLEATED RED BLOOD CELLS: 1 /100 WBC (ref 0–0)
OSMOLALITY CALCULATION: 293 MOSM/KG (ref 278–305)
PDW BLD-RTO: 15.3 % (ref 11–15)
PLATELET # BLD: 269 10E3/UL (ref 140–400)
PMV BLD AUTO: 9.1 FL (ref 7.5–11.5)
POTASSIUM SERPL-SCNC: 4.5 MMOL/L (ref 3.5–5.3)
RBC # BLD: 5.65 10E6/UL (ref 4.2–5.8)
SEGMENTED NEUTROPHILS RELATIVE PERCENT: 63.9 % (ref 40–80)
SODIUM BLD-SCNC: 142 MMOL/L (ref 133–146)
TOTAL PROTEIN: 7.2 G/DL (ref 6.4–8.9)
WBC # BLD: 8.6 10E3/UL (ref 3.8–10.8)

## 2024-01-04 ENCOUNTER — OFFICE VISIT (OUTPATIENT)
Dept: FAMILY MEDICINE CLINIC | Age: 54
End: 2024-01-04
Payer: COMMERCIAL

## 2024-01-04 VITALS
BODY MASS INDEX: 51.04 KG/M2 | HEIGHT: 64 IN | SYSTOLIC BLOOD PRESSURE: 136 MMHG | WEIGHT: 299 LBS | DIASTOLIC BLOOD PRESSURE: 80 MMHG | RESPIRATION RATE: 16 BRPM | OXYGEN SATURATION: 97 % | HEART RATE: 83 BPM

## 2024-01-04 DIAGNOSIS — D35.2 PITUITARY MICROADENOMA (HCC): Chronic | ICD-10-CM

## 2024-01-04 DIAGNOSIS — F41.9 ANXIETY: Chronic | ICD-10-CM

## 2024-01-04 PROCEDURE — 3079F DIAST BP 80-89 MM HG: CPT | Performed by: NURSE PRACTITIONER

## 2024-01-04 PROCEDURE — 99214 OFFICE O/P EST MOD 30 MIN: CPT | Performed by: NURSE PRACTITIONER

## 2024-01-04 PROCEDURE — 3075F SYST BP GE 130 - 139MM HG: CPT | Performed by: NURSE PRACTITIONER

## 2024-01-04 RX ORDER — ALPRAZOLAM 0.5 MG/1
0.5 TABLET ORAL NIGHTLY PRN
Qty: 30 TABLET | Refills: 0 | Status: SHIPPED | OUTPATIENT
Start: 2024-01-04 | End: 2024-02-03

## 2024-01-04 ASSESSMENT — ENCOUNTER SYMPTOMS
SHORTNESS OF BREATH: 0
CHEST TIGHTNESS: 0
WHEEZING: 0
GASTROINTESTINAL NEGATIVE: 1
COUGH: 0

## 2024-01-04 ASSESSMENT — PATIENT HEALTH QUESTIONNAIRE - PHQ9
SUM OF ALL RESPONSES TO PHQ QUESTIONS 1-9: 2
SUM OF ALL RESPONSES TO PHQ QUESTIONS 1-9: 2
2. FEELING DOWN, DEPRESSED OR HOPELESS: 1
SUM OF ALL RESPONSES TO PHQ QUESTIONS 1-9: 2
SUM OF ALL RESPONSES TO PHQ9 QUESTIONS 1 & 2: 2
SUM OF ALL RESPONSES TO PHQ QUESTIONS 1-9: 2
1. LITTLE INTEREST OR PLEASURE IN DOING THINGS: 1

## 2024-01-04 NOTE — PROGRESS NOTES
2024     Michel Rocha (:  1970) is a 53 y.o. male, here for evaluation of the following medical concerns:    Chief Complaint   Patient presents with    MRI results     Patient arrives today to update regarding his adenoma.  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  He has stopped all of his supplements, is going to start on Zetia for his strong risk of familial risk for hypercholesterolemia even though his ASCVD Risk is 7.3%.  his anxiety has worsened since getting the results of the MRI.  He is in need of a refill of his Alprazolam which he uses PRN.  He is seeing Dr. William Young at [facility] on Monday and will have a more clear idea of what his surgical path will show.    The 10-year ASCVD risk score (Taj MEADE, et al., 2019) is: 7.3%    Values used to calculate the score:      Age: 53 years      Sex: Male      Is Non- : No      Diabetic: No      Tobacco smoker: No      Systolic Blood Pressure: 136 mmHg      Is BP treated: Yes      HDL Cholesterol: 37 mg/dL      Total Cholesterol: 213 mg/dL      Review of Systems   Constitutional:  Negative for chills, diaphoresis, fatigue and fever.   Respiratory:  Negative for cough, chest tightness, shortness of breath and wheezing.    Cardiovascular:  Negative for chest pain and palpitations.   Gastrointestinal: Negative.    Genitourinary: Negative.    Neurological:  Negative for dizziness, weakness and headaches.       Prior to Visit Medications    Medication Sig Taking? Authorizing Provider   ALPRAZolam (XANAX) 0.5 MG tablet Take 1 tablet by mouth nightly as needed for Anxiety for up to 30 days. Max Daily Amount: 0.5 mg Yes Idalia Tolliver, APRN - CNP   albuterol sulfate HFA (PROAIR HFA) 108 (90 Base) MCG/ACT inhaler INHALE TWO PUFFS INTO THE LUNGS EVERY 4 HOURS AS NEEDED FOR WHEEZING or shortness of breath Yes Jhon Holt MD

## 2024-02-14 DIAGNOSIS — I10 ESSENTIAL HYPERTENSION: ICD-10-CM

## 2024-02-14 RX ORDER — LOSARTAN POTASSIUM 25 MG/1
25 TABLET ORAL DAILY
Qty: 90 TABLET | Refills: 1 | Status: SHIPPED | OUTPATIENT
Start: 2024-02-14

## 2024-02-14 NOTE — TELEPHONE ENCOUNTER
Medication:   Requested Prescriptions     Pending Prescriptions Disp Refills    losartan (COZAAR) 25 MG tablet [Pharmacy Med Name: LOSARTAN 25MG TABLETS] 90 tablet 1     Sig: TAKE 1 TABLET BY MOUTH DAILY       Last Filled:  8/23/2023, 90, 1    Patient Phone Number: [phone] (home)     Last appt: 1/4/2024   Next appt: 2/22/2024    Lab Results   Component Value Date     12/07/2023    K 4.5 12/07/2023     12/07/2023    CO2 28 12/07/2023    BUN 14 12/07/2023    CREATININE 0.83 12/07/2023    GLUCOSE 80 12/07/2023    CALCIUM 9.6 12/07/2023    PROT 7.2 12/07/2023    LABALBU 4.5 12/07/2023    BILITOT 0.4 12/07/2023    ALKPHOS 57 12/07/2023    AST 14 12/07/2023    ALT 22 12/07/2023    LABGLOM >60 11/01/2021    GFRAA >60 11/01/2021    AGRATIO 1.5 11/01/2021    GLOB 2.5 01/29/2020

## 2024-02-22 ENCOUNTER — OFFICE VISIT (OUTPATIENT)
Dept: FAMILY MEDICINE CLINIC | Age: 54
End: 2024-02-22
Payer: COMMERCIAL

## 2024-02-22 VITALS
OXYGEN SATURATION: 99 % | DIASTOLIC BLOOD PRESSURE: 70 MMHG | SYSTOLIC BLOOD PRESSURE: 132 MMHG | BODY MASS INDEX: 50.88 KG/M2 | WEIGHT: 298 LBS | HEART RATE: 90 BPM | HEIGHT: 64 IN | RESPIRATION RATE: 18 BRPM

## 2024-02-22 DIAGNOSIS — D35.2 PITUITARY MICROADENOMA (HCC): Primary | Chronic | ICD-10-CM

## 2024-02-22 DIAGNOSIS — I10 ESSENTIAL HYPERTENSION: Chronic | ICD-10-CM

## 2024-02-22 PROCEDURE — 3078F DIAST BP <80 MM HG: CPT | Performed by: NURSE PRACTITIONER

## 2024-02-22 PROCEDURE — 99214 OFFICE O/P EST MOD 30 MIN: CPT | Performed by: NURSE PRACTITIONER

## 2024-02-22 PROCEDURE — 3075F SYST BP GE 130 - 139MM HG: CPT | Performed by: NURSE PRACTITIONER

## 2024-02-22 ASSESSMENT — PATIENT HEALTH QUESTIONNAIRE - PHQ9
2. FEELING DOWN, DEPRESSED OR HOPELESS: 1
SUM OF ALL RESPONSES TO PHQ9 QUESTIONS 1 & 2: 2
SUM OF ALL RESPONSES TO PHQ QUESTIONS 1-9: 2
1. LITTLE INTEREST OR PLEASURE IN DOING THINGS: 1
SUM OF ALL RESPONSES TO PHQ QUESTIONS 1-9: 2

## 2024-02-22 ASSESSMENT — ENCOUNTER SYMPTOMS
CHEST TIGHTNESS: 0
COUGH: 0
WHEEZING: 0
SHORTNESS OF BREATH: 0
GASTROINTESTINAL NEGATIVE: 1
BACK PAIN: 1

## 2024-02-22 NOTE — PROGRESS NOTES
Levothyroxine Sodium 75 MCG CAPS  Yes ProviderJaiden MD   Cholecalciferol (VITAMIN D3) 58621 units CAPS Take by mouth once a week Yes Provider, MD Jaiden      Social History     Tobacco Use    Smoking status: Former     Current packs/day: 0.00     Average packs/day: 1 pack/day for 3.0 years (3.0 ttl pk-yrs)     Types: Cigarettes     Start date: 1987     Quit date: 1990     Years since quittin.0    Smokeless tobacco: Never   Vaping Use    Vaping Use: Never used   Substance Use Topics    Alcohol use: Yes     Alcohol/week: 0.0 standard drinks of alcohol     Comment: social    Drug use: No      Vitals:    24 1306   BP: 132/70   Site: Left Upper Arm   Position: Sitting   Cuff Size: Large Adult   Pulse: 90   Resp: 18   SpO2: 99%   Weight: 135.2 kg (298 lb)   Height: 1.626 m (5' 4\")     Estimated body mass index is 51.15 kg/m² as calculated from the following:    Height as of this encounter: 1.626 m (5' 4\").    Weight as of this encounter: 135.2 kg (298 lb).    Physical Exam  Vitals and nursing note reviewed.   Constitutional:       General: He is awake. He is not in acute distress.     Appearance: Normal appearance. He is well-developed, well-groomed and normal weight. He is not ill-appearing, toxic-appearing or diaphoretic.   Cardiovascular:      Rate and Rhythm: Normal rate and regular rhythm.      Heart sounds: Normal heart sounds, S1 normal and S2 normal. No murmur heard.  Pulmonary:      Effort: Pulmonary effort is normal.      Breath sounds: Normal breath sounds and air entry.   Skin:     General: Skin is warm and dry.      Capillary Refill: Capillary refill takes less than 2 seconds.   Neurological:      General: No focal deficit present.      Mental Status: He is alert.   Psychiatric:         Mood and Affect: Mood normal.         Behavior: Behavior is cooperative.     ASSESSMENT/PLAN:  1. Pituitary microadenoma (HCC)  Unstable  Planning well for surgery, still having increased

## 2024-06-13 ENCOUNTER — TELEPHONE (OUTPATIENT)
Dept: FAMILY MEDICINE CLINIC | Age: 54
End: 2024-06-13

## 2024-06-14 NOTE — TELEPHONE ENCOUNTER
Filled out forms, printed copies of testing, returned to [facility], will fax to disability services and send copy to patient in provided envelope

## 2024-08-05 NOTE — TELEPHONE ENCOUNTER
Onset: 1 year       Location / description:   Patient states she still has vision but when looking through her right eye she thinks she needs a new prescription.  Denies any vision loss. Dark floaters and light floaters started within the year, started out pretty small and now has gotten more pronounced.    Precipitating Factors:   1 year of floaters  Pain Scale (1-10), 10 highest: 0/10 denies any pain  What improves/worsens symptoms:   Nothing makes it better or worse  Symptom specific medications:   LMP : Only if pertains to symptom. 3 weeks ago  Are you pregnant or breast feeding: denies   Recent visits (last 3-4 weeks) for same reason or recent surgery:  no recent visits    PLAN:  Provider's office  See Provider within 2 weeks  Patient/Caller agrees to follow recommendations.  Patient wants to make an appt with eye doctor and warm transferred to [facility] for specialty appt.  Reason for Disposition   [1] Blurred vision or visual changes AND [2] gradual onset (e.g., weeks, months)    Additional Information   Negative: [1] Blurred vision or visual changes AND [2] present now AND [3] sudden onset or new (e.g., minutes, hours, days)  (Exception: Seeing floaters / black specks OR previously diagnosed migraine headaches with same symptoms.)     1 year   Negative: Many floaters in the eye  (Exception: Floater(s) are a chronic symptom and this is unchanged from patient's baseline pattern.)     1 year    Protocols used: Vision Loss or Change-A-AH     Patient is informed and no questions

## 2024-08-26 DIAGNOSIS — I10 ESSENTIAL HYPERTENSION: ICD-10-CM

## 2024-08-26 RX ORDER — LOSARTAN POTASSIUM 25 MG/1
25 TABLET ORAL DAILY
Qty: 90 TABLET | Refills: 0 | Status: SHIPPED | OUTPATIENT
Start: 2024-08-26

## 2024-08-26 NOTE — TELEPHONE ENCOUNTER
Medication:   Requested Prescriptions     Pending Prescriptions Disp Refills    losartan (COZAAR) 25 MG tablet [Pharmacy Med Name: LOSARTAN 25MG TABLETS] 90 tablet 1     Sig: TAKE 1 TABLET BY MOUTH DAILY       Last Filled:  2/14/24    Patient Phone Number: [phone] (home)     Last appt: 2/22/2024   Next appt: Visit date not found    Lab Results   Component Value Date     03/19/2024    K 3.9 03/19/2024     03/19/2024    CO2 27 03/19/2024    BUN 12 03/19/2024    CREATININE 0.69 03/19/2024    GLUCOSE 90 03/19/2024    CALCIUM 9.9 03/19/2024    BILITOT 0.3 03/19/2024    ALKPHOS 57 03/19/2024    AST 15 03/19/2024    ALT 19 03/19/2024    LABGLOM >60 11/01/2021    GFRAA >60 11/01/2021    AGRATIO 1.5 11/01/2021    GLOB 2.5 01/29/2020

## 2024-09-06 ENCOUNTER — TELEPHONE (OUTPATIENT)
Dept: FAMILY MEDICINE CLINIC | Age: 54
End: 2024-09-06

## 2024-09-09 SDOH — ECONOMIC STABILITY: INCOME INSECURITY: HOW HARD IS IT FOR YOU TO PAY FOR THE VERY BASICS LIKE FOOD, HOUSING, MEDICAL CARE, AND HEATING?: NOT VERY HARD

## 2024-09-09 SDOH — ECONOMIC STABILITY: FOOD INSECURITY: WITHIN THE PAST 12 MONTHS, YOU WORRIED THAT YOUR FOOD WOULD RUN OUT BEFORE YOU GOT MONEY TO BUY MORE.: NEVER TRUE

## 2024-09-09 SDOH — ECONOMIC STABILITY: FOOD INSECURITY: WITHIN THE PAST 12 MONTHS, THE FOOD YOU BOUGHT JUST DIDN'T LAST AND YOU DIDN'T HAVE MONEY TO GET MORE.: NEVER TRUE

## 2024-09-10 ENCOUNTER — OFFICE VISIT (OUTPATIENT)
Dept: FAMILY MEDICINE CLINIC | Age: 54
End: 2024-09-10
Payer: COMMERCIAL

## 2024-09-10 VITALS
HEIGHT: 64 IN | SYSTOLIC BLOOD PRESSURE: 126 MMHG | HEART RATE: 87 BPM | WEIGHT: 315 LBS | OXYGEN SATURATION: 98 % | DIASTOLIC BLOOD PRESSURE: 70 MMHG | BODY MASS INDEX: 53.78 KG/M2

## 2024-09-10 DIAGNOSIS — I10 ESSENTIAL HYPERTENSION: ICD-10-CM

## 2024-09-10 DIAGNOSIS — Z13.1 DIABETES MELLITUS SCREENING: ICD-10-CM

## 2024-09-10 DIAGNOSIS — K59.09 CHRONIC CONSTIPATION: ICD-10-CM

## 2024-09-10 DIAGNOSIS — Z12.5 ENCOUNTER FOR PROSTATE CANCER SCREENING: ICD-10-CM

## 2024-09-10 DIAGNOSIS — M45.9 ANKYLOSING SPONDYLITIS, UNSPECIFIED SITE OF SPINE (HCC): ICD-10-CM

## 2024-09-10 DIAGNOSIS — Z13.21 ENCOUNTER FOR VITAMIN DEFICIENCY SCREENING: ICD-10-CM

## 2024-09-10 DIAGNOSIS — M45.0 ANKYLOSING SPONDYLITIS OF MULTIPLE SITES IN SPINE (HCC): ICD-10-CM

## 2024-09-10 DIAGNOSIS — G47.33 OSA ON CPAP: ICD-10-CM

## 2024-09-10 DIAGNOSIS — Z00.00 ANNUAL PHYSICAL EXAM: Primary | ICD-10-CM

## 2024-09-10 DIAGNOSIS — D80.6 ANTIBODY DEFICIENCY SYNDROME (HCC): ICD-10-CM

## 2024-09-10 DIAGNOSIS — D35.2 PITUITARY MICROADENOMA (HCC): ICD-10-CM

## 2024-09-10 PROCEDURE — 99396 PREV VISIT EST AGE 40-64: CPT | Performed by: NURSE PRACTITIONER

## 2024-09-10 PROCEDURE — 3078F DIAST BP <80 MM HG: CPT | Performed by: NURSE PRACTITIONER

## 2024-09-10 PROCEDURE — 3074F SYST BP LT 130 MM HG: CPT | Performed by: NURSE PRACTITIONER

## 2024-09-10 RX ORDER — LEVOTHYROXINE SODIUM 88 UG/1
88 TABLET ORAL DAILY
COMMUNITY
Start: 2024-08-06

## 2024-09-10 RX ORDER — TESTOSTERONE 30 MG/1.5ML
30 SOLUTION TOPICAL
COMMUNITY
Start: 2024-08-15

## 2024-09-10 RX ORDER — DOCUSATE SODIUM 100 MG/1
100 CAPSULE, LIQUID FILLED ORAL 3 TIMES DAILY PRN
Qty: 120 CAPSULE | Refills: 3 | Status: SHIPPED | OUTPATIENT
Start: 2024-09-10

## 2024-09-10 ASSESSMENT — ENCOUNTER SYMPTOMS
EYES NEGATIVE: 1
WHEEZING: 0
COUGH: 0
SINUS PAIN: 0
SORE THROAT: 0
CHEST TIGHTNESS: 0
VOMITING: 0
CONSTIPATION: 0
BLOOD IN STOOL: 0
NAUSEA: 0
DIARRHEA: 0
ABDOMINAL PAIN: 0
SINUS PRESSURE: 0
SHORTNESS OF BREATH: 0

## 2024-09-10 ASSESSMENT — PATIENT HEALTH QUESTIONNAIRE - PHQ9
2. FEELING DOWN, DEPRESSED OR HOPELESS: SEVERAL DAYS
SUM OF ALL RESPONSES TO PHQ QUESTIONS 1-9: 2
SUM OF ALL RESPONSES TO PHQ9 QUESTIONS 1 & 2: 2
SUM OF ALL RESPONSES TO PHQ QUESTIONS 1-9: 2
1. LITTLE INTEREST OR PLEASURE IN DOING THINGS: SEVERAL DAYS

## 2024-09-11 LAB
ALBUMIN SERPL-MCNC: 4.2 G/DL (ref 3.4–5)
ALBUMIN/GLOB SERPL: 1.8 {RATIO} (ref 1.1–2.2)
ALP SERPL-CCNC: 60 U/L (ref 40–129)
ALT SERPL-CCNC: 23 U/L (ref 10–40)
ANION GAP SERPL CALCULATED.3IONS-SCNC: 13 MMOL/L (ref 3–16)
AST SERPL-CCNC: 19 U/L (ref 15–37)
BASOPHILS # BLD: 0.1 K/UL (ref 0–0.2)
BASOPHILS NFR BLD: 0.8 %
BILIRUB SERPL-MCNC: <0.2 MG/DL (ref 0–1)
BUN SERPL-MCNC: 13 MG/DL (ref 7–20)
CALCIUM SERPL-MCNC: 9.8 MG/DL (ref 8.3–10.6)
CHLORIDE SERPL-SCNC: 104 MMOL/L (ref 99–110)
CO2 SERPL-SCNC: 27 MMOL/L (ref 21–32)
CREAT SERPL-MCNC: 0.7 MG/DL (ref 0.9–1.3)
DEPRECATED RDW RBC AUTO: 14.5 % (ref 12.4–15.4)
EOSINOPHIL # BLD: 0.2 K/UL (ref 0–0.6)
EOSINOPHIL NFR BLD: 2.5 %
EST. AVERAGE GLUCOSE BLD GHB EST-MCNC: 105.4 MG/DL
GFR SERPLBLD CREATININE-BSD FMLA CKD-EPI: >90 ML/MIN/{1.73_M2}
GLUCOSE P FAST SERPL-MCNC: 81 MG/DL (ref 70–99)
HBA1C MFR BLD: 5.3 %
HCT VFR BLD AUTO: 45.3 % (ref 40.5–52.5)
HGB BLD-MCNC: 15 G/DL (ref 13.5–17.5)
LYMPHOCYTES # BLD: 1.7 K/UL (ref 1–5.1)
LYMPHOCYTES NFR BLD: 23.7 %
MCH RBC QN AUTO: 28.3 PG (ref 26–34)
MCHC RBC AUTO-ENTMCNC: 33 G/DL (ref 31–36)
MCV RBC AUTO: 85.7 FL (ref 80–100)
MONOCYTES # BLD: 0.6 K/UL (ref 0–1.3)
MONOCYTES NFR BLD: 7.7 %
NEUTROPHILS # BLD: 4.8 K/UL (ref 1.7–7.7)
NEUTROPHILS NFR BLD: 65.3 %
PLATELET # BLD AUTO: 250 K/UL (ref 135–450)
PMV BLD AUTO: 9.4 FL (ref 5–10.5)
POTASSIUM SERPL-SCNC: 4.2 MMOL/L (ref 3.5–5.1)
PROT SERPL-MCNC: 6.6 G/DL (ref 6.4–8.2)
PSA SERPL DL<=0.01 NG/ML-MCNC: 0.91 NG/ML (ref 0–4)
RBC # BLD AUTO: 5.28 M/UL (ref 4.2–5.9)
SODIUM SERPL-SCNC: 144 MMOL/L (ref 136–145)
URATE SERPL-MCNC: 6.9 MG/DL (ref 3.5–7.2)
WBC # BLD AUTO: 7.3 K/UL (ref 4–11)

## 2024-10-21 ASSESSMENT — SLEEP AND FATIGUE QUESTIONNAIRES
HOW LIKELY ARE YOU TO NOD OFF OR FALL ASLEEP WHILE SITTING QUIETLY AFTER LUNCH WITHOUT ALCOHOL: WOULD NEVER DOZE
HOW LIKELY ARE YOU TO NOD OFF OR FALL ASLEEP WHILE SITTING AND READING: WOULD NEVER DOZE
HOW LIKELY ARE YOU TO NOD OFF OR FALL ASLEEP IN A CAR, WHILE STOPPED FOR A FEW MINUTES IN TRAFFIC: WOULD NEVER DOZE
HOW LIKELY ARE YOU TO NOD OFF OR FALL ASLEEP WHILE SITTING AND TALKING TO SOMEONE: WOULD NEVER DOZE
HOW LIKELY ARE YOU TO NOD OFF OR FALL ASLEEP WHILE SITTING INACTIVE IN A PUBLIC PLACE: WOULD NEVER DOZE
HOW LIKELY ARE YOU TO NOD OFF OR FALL ASLEEP IN A CAR, WHILE STOPPED FOR A FEW MINUTES IN TRAFFIC: WOULD NEVER DOZE
ESS TOTAL SCORE: 4
HOW LIKELY ARE YOU TO NOD OFF OR FALL ASLEEP WHILE SITTING AND READING: WOULD NEVER DOZE
HOW LIKELY ARE YOU TO NOD OFF OR FALL ASLEEP WHILE SITTING AND TALKING TO SOMEONE: WOULD NEVER DOZE
HOW LIKELY ARE YOU TO NOD OFF OR FALL ASLEEP WHILE LYING DOWN TO REST IN THE AFTERNOON WHEN CIRCUMSTANCES PERMIT: MODERATE CHANCE OF DOZING
HOW LIKELY ARE YOU TO NOD OFF OR FALL ASLEEP WHILE LYING DOWN TO REST IN THE AFTERNOON WHEN CIRCUMSTANCES PERMIT: MODERATE CHANCE OF DOZING
HOW LIKELY ARE YOU TO NOD OFF OR FALL ASLEEP WHILE SITTING INACTIVE IN A PUBLIC PLACE: WOULD NEVER DOZE
HOW LIKELY ARE YOU TO NOD OFF OR FALL ASLEEP WHEN YOU ARE A PASSENGER IN A CAR FOR AN HOUR WITHOUT A BREAK: SLIGHT CHANCE OF DOZING
HOW LIKELY ARE YOU TO NOD OFF OR FALL ASLEEP WHILE SITTING QUIETLY AFTER LUNCH WITHOUT ALCOHOL: WOULD NEVER DOZE
HOW LIKELY ARE YOU TO NOD OFF OR FALL ASLEEP WHILE WATCHING TV: SLIGHT CHANCE OF DOZING
HOW LIKELY ARE YOU TO NOD OFF OR FALL ASLEEP WHEN YOU ARE A PASSENGER IN A CAR FOR AN HOUR WITHOUT A BREAK: SLIGHT CHANCE OF DOZING
HOW LIKELY ARE YOU TO NOD OFF OR FALL ASLEEP WHILE WATCHING TV: SLIGHT CHANCE OF DOZING

## 2024-10-23 ENCOUNTER — OFFICE VISIT (OUTPATIENT)
Dept: PULMONOLOGY | Age: 54
End: 2024-10-23
Payer: COMMERCIAL

## 2024-10-23 VITALS
TEMPERATURE: 98.4 F | BODY MASS INDEX: 53.78 KG/M2 | OXYGEN SATURATION: 98 % | HEIGHT: 64 IN | SYSTOLIC BLOOD PRESSURE: 147 MMHG | DIASTOLIC BLOOD PRESSURE: 95 MMHG | RESPIRATION RATE: 16 BRPM | WEIGHT: 315 LBS | HEART RATE: 96 BPM

## 2024-10-23 DIAGNOSIS — G47.33 OSA ON CPAP: Primary | ICD-10-CM

## 2024-10-23 PROCEDURE — 3080F DIAST BP >= 90 MM HG: CPT | Performed by: INTERNAL MEDICINE

## 2024-10-23 PROCEDURE — 99213 OFFICE O/P EST LOW 20 MIN: CPT | Performed by: INTERNAL MEDICINE

## 2024-10-23 PROCEDURE — 3077F SYST BP >= 140 MM HG: CPT | Performed by: INTERNAL MEDICINE

## 2024-10-23 NOTE — PROGRESS NOTES
MA Communication:  The following orders are received by verbal communication from Jhon Holt MD        Orders include:  1 year f/u

## 2024-10-23 NOTE — PROGRESS NOTES
Chief Complaint: dyspnea    Referring Provider: Nathan GARCIA HPI:   CHESTER treated with benefit with CPAP 13.   Has RESMED machine, purchased outright.  I reviewed data, 100% compliant, AHI 0.2.  Use 8:16.  Had recurrent pituitary adenoma with transphenoidal resection by  ENT.         10/21/2024     3:47 PM 10/18/2023     9:12 AM 10/12/2022    10:56 AM 7/13/2021     8:09 AM 7/15/2020     9:20 AM 7/11/2019     8:16 AM 5/25/2018     8:15 AM   Sleep Medicine   Sitting and reading 0 1 0 0 0 0 0   Watching TV 1 1 1 0 1 1 0   Sitting, inactive in a public place (e.g. a theatre or a meeting) 0 0 0 0 0 0 0   As a passenger in a car for an hour without a break 1 1 2 1 1 2 2   Lying down to rest in the afternoon when circumstances permit 2 2 2 2 2 1 1   Sitting and talking to someone 0 0 0 0 0 0 0   Sitting quietly after a lunch without alcohol 0 0 0 0 0 0 0   In a car, while stopped for a few minutes in traffic 0 0 0 0 0 0 0   [place] Sleepiness Score 4 5 5 3 4 4 3   Neck (Inches)  19.25 19.25 21.5  21 20.5       PRESENTING HPI: Respiratory illness several years ago, followed by feeling like he does not get adequate lung volumes, associated with shortness of breath especially in shower, waking cough, post tussive emesis, reactive in cold.  Has Albuterol PRN which is somewhat helpful, especially for wheezing.  Sleep:  Several year h/o severe snoring associated with few witnessed apneas, worse on back, no treatments tried so far.  Was evaluated by Rayo Wooten    Subsequent: dyspnea improved over last 8 weeks, albuterol clearly helpful, sometimes wheezes  2 trials Dulera, both followed by febrile illness and worsened cough.  Cough has persisted for 3 months, is productive, especially severe at night.    Physical Exam:  Blood pressure (!) 147/95, pulse 96, temperature 98.4 °F (36.9 °C), temperature source Temporal, resp. rate 16, height 1.626 m (5' 4\"), weight (!) 153.3 kg (338 lb), SpO2 98%.'371# in 2017, 392# in 2018,

## 2024-10-23 NOTE — PATIENT INSTRUCTIONS
Remember to bring a list of pulmonary medications and any CPAP or BiPAP machines to your next appointment with the office.     Please keep all of your future appointments scheduled by [facility] [place] [facility] office. Out of respect for other patients and providers, you may be asked to reschedule your appointment if you arrive later than your scheduled appointment time. Appointments cancelled less than 24hrs in advance will be considered a no show. Patients with three missed appointments within 1 year or four missed appointments within 2 years can be dismissed from the practice.     Please be aware that our physicians are required to work in the Intensive Care Unit at [facility].  Your appointment may need to be rescheduled if they are designated to work during your appointment time.      You may receive a survey regarding the care you received during your visit.  Your input is valuable to us.  We encourage you to complete and return your survey.  We hope you will choose us in the future for your healthcare needs.     Pt instructed of all future appointment dates & times, including radiology, labs, procedures & referrals. If procedures were scheduled preparation instructions provided. Instructions on future appointments with [facility] were given.     In the next few weeks, you will be receiving a survey from [facility] regarding your visit today.  We would greatly appreciate it if you would take just a few minutes to fill that out.  It is very important to us that our patients receive top notch care and our surveys help keep us accountable. However, if your experience was not a good one, we want to hear about that as well. This is a key way we can keep track of problems and strive to correct any for future visits.    Again, we appreciate your time and thank you for choosing [facility]!    Lola CMA

## 2024-11-20 DIAGNOSIS — I10 ESSENTIAL HYPERTENSION: ICD-10-CM

## 2024-11-20 NOTE — TELEPHONE ENCOUNTER
Medication:   Requested Prescriptions     Pending Prescriptions Disp Refills    losartan (COZAAR) 25 MG tablet [Pharmacy Med Name: LOSARTAN 25MG TABLETS] 90 tablet 0     Sig: TAKE 1 TABLET BY MOUTH DAILY       Last Filled:  8/26/2024, 90, 0    Patient Phone Number: [phone] (home)     Last appt: 9/10/2024   Next appt: 12/17/2024    Lab Results   Component Value Date     09/10/2024    K 4.2 09/10/2024     09/10/2024    CO2 27 09/10/2024    BUN 13 09/10/2024    CREATININE 0.7 (L) 09/10/2024    GLUCOSE 90 03/19/2024    CALCIUM 9.8 09/10/2024    BILITOT <0.2 09/10/2024    ALKPHOS 60 09/10/2024    AST 19 09/10/2024    ALT 23 09/10/2024    LABGLOM >90 09/10/2024    GFRAA >60 11/01/2021    AGRATIO 1.8 09/10/2024    GLOB 2.5 01/29/2020

## 2024-11-21 RX ORDER — LOSARTAN POTASSIUM 25 MG/1
25 TABLET ORAL DAILY
Qty: 90 TABLET | Refills: 0 | Status: SHIPPED | OUTPATIENT
Start: 2024-11-21

## 2024-12-05 ENCOUNTER — TELEPHONE (OUTPATIENT)
Dept: FAMILY MEDICINE CLINIC | Age: 54
End: 2024-12-05

## 2024-12-05 NOTE — TELEPHONE ENCOUNTER
Received paperwork from [employer].  They are evaluating the claim, they need additional information.  Claim documentation can be submitted to [employer] (address on paperwork or fax [phone].    (scanned paperwork and attached to phone encounter)

## 2024-12-17 ENCOUNTER — OFFICE VISIT (OUTPATIENT)
Dept: FAMILY MEDICINE CLINIC | Age: 54
End: 2024-12-17

## 2024-12-17 VITALS
WEIGHT: 315 LBS | HEART RATE: 72 BPM | SYSTOLIC BLOOD PRESSURE: 128 MMHG | RESPIRATION RATE: 16 BRPM | DIASTOLIC BLOOD PRESSURE: 86 MMHG | BODY MASS INDEX: 53.78 KG/M2 | HEIGHT: 64 IN | OXYGEN SATURATION: 97 %

## 2024-12-17 DIAGNOSIS — D35.2 PITUITARY MICROADENOMA (HCC): ICD-10-CM

## 2024-12-17 DIAGNOSIS — F41.9 ANXIETY: ICD-10-CM

## 2024-12-17 DIAGNOSIS — Z23 NEED FOR VACCINATION: ICD-10-CM

## 2024-12-17 DIAGNOSIS — M45.0 ANKYLOSING SPONDYLITIS OF MULTIPLE SITES IN SPINE (HCC): ICD-10-CM

## 2024-12-17 DIAGNOSIS — R10.9 ABDOMINAL WALL PAIN: ICD-10-CM

## 2024-12-17 DIAGNOSIS — I10 ESSENTIAL HYPERTENSION: Primary | Chronic | ICD-10-CM

## 2024-12-17 RX ORDER — ALPRAZOLAM 0.5 MG
0.5 TABLET ORAL NIGHTLY PRN
Qty: 30 TABLET | Refills: 0 | Status: SHIPPED | OUTPATIENT
Start: 2024-12-17 | End: 2025-01-16

## 2024-12-17 RX ORDER — LOSARTAN POTASSIUM 25 MG/1
25 TABLET ORAL DAILY
Qty: 90 TABLET | Refills: 3 | Status: SHIPPED | OUTPATIENT
Start: 2024-12-17

## 2024-12-17 RX ORDER — BACLOFEN 10 MG/1
10 TABLET ORAL 2 TIMES DAILY
COMMUNITY
Start: 2024-11-20

## 2024-12-17 ASSESSMENT — PATIENT HEALTH QUESTIONNAIRE - PHQ9
SUM OF ALL RESPONSES TO PHQ QUESTIONS 1-9: 2
SUM OF ALL RESPONSES TO PHQ9 QUESTIONS 1 & 2: 2
1. LITTLE INTEREST OR PLEASURE IN DOING THINGS: SEVERAL DAYS
SUM OF ALL RESPONSES TO PHQ QUESTIONS 1-9: 2

## 2024-12-17 ASSESSMENT — ENCOUNTER SYMPTOMS
CHEST TIGHTNESS: 0
GASTROINTESTINAL NEGATIVE: 1
WHEEZING: 0
COUGH: 0
SHORTNESS OF BREATH: 0

## 2024-12-17 NOTE — PROGRESS NOTES
Estimated body mass index is 60.08 kg/m² as calculated from the following:    Height as of this encounter: 1.626 m (5' 4\").    Weight as of this encounter: 158.8 kg (350 lb).    Physical Exam  Vitals and nursing note reviewed.   Constitutional:       General: He is awake. He is not in acute distress.     Appearance: Normal appearance. He is well-developed. He is morbidly obese. He is not ill-appearing, toxic-appearing or diaphoretic.   Cardiovascular:      Rate and Rhythm: Normal rate and regular rhythm.      Heart sounds: Normal heart sounds, S1 normal and S2 normal. No murmur heard.  Pulmonary:      Effort: Pulmonary effort is normal.      Breath sounds: Normal breath sounds and air entry.   Skin:     General: Skin is warm and dry.      Capillary Refill: Capillary refill takes less than 2 seconds.   Neurological:      General: No focal deficit present.      Mental Status: He is alert.   Psychiatric:         Mood and Affect: Mood normal.         Behavior: Behavior is cooperative.     ASSESSMENT/PLAN:  1. Essential hypertension  Stable  Continue Losartain 25mg daily  DASH diet  Recommended at least 30 minutes of aerobic exercise daily.  - losartan (COZAAR) 25 MG tablet; Take 1 tablet by mouth daily  Dispense: 90 tablet; Refill: 3    2. Anxiety  Stable  Use sparingly  No inconsistencies with OARRS.    Medication initiated after consulting with collaborating physician.  Patient understands he is responsible for scheduling every 90 days for refills, if medication is lost, replacement medication will not be given, will not share medication with others, if fails drug screen will no longer be given controlled substances.  - ALPRAZolam (XANAX) 0.5 MG tablet; Take 1 tablet by mouth nightly as needed for Anxiety for up to 30 days. Max Daily Amount: 0.5 mg  Dispense: 30 tablet; Refill: 0    3. Ankylosing spondylitis of multiple sites in spine (HCC)  Unstable, managed by rheumatology, restarting cocentyx in the next few

## 2024-12-18 PROBLEM — F41.9 ANXIETY: Status: ACTIVE | Noted: 2024-12-18

## 2025-01-09 ENCOUNTER — TELEPHONE (OUTPATIENT)
Dept: FAMILY MEDICINE CLINIC | Age: 55
End: 2025-01-09

## 2025-01-09 NOTE — TELEPHONE ENCOUNTER
Received office notes from  Rheumatology.  Chief complaint joint pain (shoulder, spine and hip).    (scanned paperwork and attached to phone encounter)

## 2025-02-04 DIAGNOSIS — K59.09 CHRONIC CONSTIPATION: ICD-10-CM

## 2025-02-04 RX ORDER — DOCUSATE SODIUM 100 MG/1
CAPSULE, LIQUID FILLED ORAL
Qty: 120 CAPSULE | Refills: 3 | Status: SHIPPED | OUTPATIENT
Start: 2025-02-04

## 2025-02-04 NOTE — TELEPHONE ENCOUNTER
Medication:   Requested Prescriptions     Pending Prescriptions Disp Refills    docusate sodium (COLACE) 100 MG capsule [Pharmacy Med Name: DOCUSATE SOD 100MG CAPSULES] 120 capsule 3     Sig: TAKE 1 CAPSULE BY MOUTH THREE TIMES DAILY AS NEEDED FOR CONSTIPATION        Last Filled:  9/10/2024, 120, 3    Patient Phone Number: [phone] (home)     Last appt: 12/17/2024   Next appt: 3/18/2025    Last OARRS:       3/15/2021     1:09 PM   RX Monitoring   Periodic Controlled Substance Monitoring No signs of potential drug abuse or diversion identified.

## 2025-03-18 ENCOUNTER — OFFICE VISIT (OUTPATIENT)
Dept: FAMILY MEDICINE CLINIC | Age: 55
End: 2025-03-18
Payer: COMMERCIAL

## 2025-03-18 VITALS
HEIGHT: 64 IN | SYSTOLIC BLOOD PRESSURE: 134 MMHG | HEART RATE: 81 BPM | BODY MASS INDEX: 53.78 KG/M2 | WEIGHT: 315 LBS | DIASTOLIC BLOOD PRESSURE: 84 MMHG | RESPIRATION RATE: 18 BRPM | OXYGEN SATURATION: 99 %

## 2025-03-18 DIAGNOSIS — M45.0 ANKYLOSING SPONDYLITIS OF MULTIPLE SITES IN SPINE (HCC): Primary | ICD-10-CM

## 2025-03-18 DIAGNOSIS — I10 ESSENTIAL HYPERTENSION: ICD-10-CM

## 2025-03-18 DIAGNOSIS — R59.0 LYMPHADENOPATHY, SUPRACLAVICULAR: ICD-10-CM

## 2025-03-18 PROCEDURE — 99214 OFFICE O/P EST MOD 30 MIN: CPT | Performed by: NURSE PRACTITIONER

## 2025-03-18 PROCEDURE — 3079F DIAST BP 80-89 MM HG: CPT | Performed by: NURSE PRACTITIONER

## 2025-03-18 PROCEDURE — G2211 COMPLEX E/M VISIT ADD ON: HCPCS | Performed by: NURSE PRACTITIONER

## 2025-03-18 PROCEDURE — 3075F SYST BP GE 130 - 139MM HG: CPT | Performed by: NURSE PRACTITIONER

## 2025-03-18 RX ORDER — CYCLOBENZAPRINE HCL 5 MG
5 TABLET ORAL 3 TIMES DAILY PRN
Qty: 270 TABLET | Refills: 1 | Status: SHIPPED | OUTPATIENT
Start: 2025-03-18 | End: 2025-09-14

## 2025-03-18 SDOH — ECONOMIC STABILITY: FOOD INSECURITY: WITHIN THE PAST 12 MONTHS, YOU WORRIED THAT YOUR FOOD WOULD RUN OUT BEFORE YOU GOT MONEY TO BUY MORE.: NEVER TRUE

## 2025-03-18 SDOH — ECONOMIC STABILITY: INCOME INSECURITY: IN THE LAST 12 MONTHS, WAS THERE A TIME WHEN YOU WERE NOT ABLE TO PAY THE MORTGAGE OR RENT ON TIME?: NO

## 2025-03-18 SDOH — ECONOMIC STABILITY: FOOD INSECURITY: WITHIN THE PAST 12 MONTHS, THE FOOD YOU BOUGHT JUST DIDN'T LAST AND YOU DIDN'T HAVE MONEY TO GET MORE.: NEVER TRUE

## 2025-03-18 ASSESSMENT — PATIENT HEALTH QUESTIONNAIRE - PHQ9
SUM OF ALL RESPONSES TO PHQ QUESTIONS 1-9: 1
SUM OF ALL RESPONSES TO PHQ QUESTIONS 1-9: 1
2. FEELING DOWN, DEPRESSED OR HOPELESS: NOT AT ALL
SUM OF ALL RESPONSES TO PHQ QUESTIONS 1-9: 1
SUM OF ALL RESPONSES TO PHQ9 QUESTIONS 1 & 2: 1
1. LITTLE INTEREST OR PLEASURE IN DOING THINGS: SEVERAL DAYS
SUM OF ALL RESPONSES TO PHQ QUESTIONS 1-9: 1
2. FEELING DOWN, DEPRESSED OR HOPELESS: NOT AT ALL
1. LITTLE INTEREST OR PLEASURE IN DOING THINGS: SEVERAL DAYS

## 2025-03-18 ASSESSMENT — ENCOUNTER SYMPTOMS
WHEEZING: 0
SHORTNESS OF BREATH: 0
CHEST TIGHTNESS: 0
COUGH: 0
GASTROINTESTINAL NEGATIVE: 1

## 2025-03-18 NOTE — PROGRESS NOTES
3/18/2025     Michel Rocha (:  1970) is a 54 y.o. male, here for evaluation of the following medical concerns:    Chief Complaint   Patient presents with    Hypertension    Follow-up     Anklysoing spondylitis specialist update     Insurance no longer covering trigger point injections.  Feeling the pain from this.  Is still doing botox which is helping with PM&R - managed by [facility]. Still doing weekly PT for shoulders.      Endocrine:  having issues with high ACTH and low cortisol levels.  Seeing Dr. Meng Seth at [facility].  Possibly something secreting ACTH or adrenal fatigue.      Rheumatology:  no longer taking biologic.  Had had recurrent sinus infections.  Had to do antibiotics three episodes.  Not starting until endocrinology gives blessing.  He is still using neti pot daily.  May restart after second ACTH testing (stim test).  He would like to start back on Cosentyx after his ACTH test.      Seeing follow up with Neuro Surg in April, will have repeat MRI in May.      Review of Systems   Constitutional:  Negative for chills, diaphoresis, fatigue and fever.   Respiratory:  Negative for cough, chest tightness, shortness of breath and wheezing.    Cardiovascular:  Negative for chest pain and palpitations.   Gastrointestinal: Negative.    Genitourinary: Negative.    Neurological:  Negative for dizziness, weakness and headaches.   Hematological:  Positive for adenopathy.        Left supraclavicular enlarged lymphnode     Prior to Visit Medications    Medication Sig Taking? Authorizing Provider   cyclobenzaprine (FLEXERIL) 5 MG tablet Take 1 tablet by mouth 3 times daily as needed for Muscle spasms Yes Idalia Tolliver, APRN - CNP   docusate sodium (COLACE) 100 MG capsule TAKE 1 CAPSULE BY MOUTH THREE TIMES DAILY AS NEEDED FOR CONSTIPATION Yes Idalia Tolliver, APRN - CNP   baclofen (LIORESAL) 10 MG tablet Take 1 tablet by mouth 2 times daily Yes Provider, MD Jaiden   losartan (COZAAR) 25 MG

## 2025-03-20 ENCOUNTER — HOSPITAL ENCOUNTER (OUTPATIENT)
Dept: ULTRASOUND IMAGING | Age: 55
Discharge: HOME | End: 2025-03-20
Payer: COMMERCIAL

## 2025-03-20 DIAGNOSIS — R59.0 LYMPHADENOPATHY, SUPRACLAVICULAR: ICD-10-CM

## 2025-03-20 PROCEDURE — 76536 US EXAM OF HEAD AND NECK: CPT

## 2025-03-21 ENCOUNTER — RESULTS FOLLOW-UP (OUTPATIENT)
Dept: ULTRASOUND IMAGING | Age: 55
End: 2025-03-21

## 2025-03-31 ENCOUNTER — TELEPHONE (OUTPATIENT)
Dept: FAMILY MEDICINE CLINIC | Age: 55
End: 2025-03-31

## 2025-03-31 NOTE — TELEPHONE ENCOUNTER
Faxed Medical Records Requested Documents to [facility] ([facility]).    Original scanned into Media    Records Requested from 3/1/2024 to present.

## 2025-06-10 ENCOUNTER — OFFICE VISIT (OUTPATIENT)
Dept: FAMILY MEDICINE CLINIC | Age: 55
End: 2025-06-10
Payer: COMMERCIAL

## 2025-06-10 VITALS
HEART RATE: 100 BPM | RESPIRATION RATE: 18 BRPM | WEIGHT: 315 LBS | HEIGHT: 64 IN | OXYGEN SATURATION: 97 % | DIASTOLIC BLOOD PRESSURE: 86 MMHG | SYSTOLIC BLOOD PRESSURE: 136 MMHG | BODY MASS INDEX: 53.78 KG/M2

## 2025-06-10 DIAGNOSIS — I50.9 ACUTE CONGESTIVE HEART FAILURE, UNSPECIFIED HEART FAILURE TYPE (HCC): ICD-10-CM

## 2025-06-10 DIAGNOSIS — M45.0 ANKYLOSING SPONDYLITIS OF MULTIPLE SITES IN SPINE (HCC): ICD-10-CM

## 2025-06-10 DIAGNOSIS — R59.1 LYMPHADENOPATHY OF HEAD AND NECK: ICD-10-CM

## 2025-06-10 DIAGNOSIS — D35.2 PITUITARY MICROADENOMA (HCC): ICD-10-CM

## 2025-06-10 DIAGNOSIS — I10 ESSENTIAL HYPERTENSION: Primary | ICD-10-CM

## 2025-06-10 DIAGNOSIS — H02.88B MEIBOMIAN GLAND DYSFUNCTION (MGD) OF UPPER AND LOWER LIDS OF BOTH EYES: ICD-10-CM

## 2025-06-10 DIAGNOSIS — H02.88A MEIBOMIAN GLAND DYSFUNCTION (MGD) OF UPPER AND LOWER LIDS OF BOTH EYES: ICD-10-CM

## 2025-06-10 PROCEDURE — 3079F DIAST BP 80-89 MM HG: CPT | Performed by: NURSE PRACTITIONER

## 2025-06-10 PROCEDURE — 99215 OFFICE O/P EST HI 40 MIN: CPT | Performed by: NURSE PRACTITIONER

## 2025-06-10 PROCEDURE — 3075F SYST BP GE 130 - 139MM HG: CPT | Performed by: NURSE PRACTITIONER

## 2025-06-10 RX ORDER — CYCLOSPORINE OPHTHALMIC SOLUTION 1 MG/ML
SOLUTION/ DROPS OPHTHALMIC
COMMUNITY
Start: 2025-02-03

## 2025-06-10 ASSESSMENT — ENCOUNTER SYMPTOMS
SHORTNESS OF BREATH: 0
COUGH: 0
WHEEZING: 0
GASTROINTESTINAL NEGATIVE: 1
CHEST TIGHTNESS: 0

## 2025-06-10 ASSESSMENT — PATIENT HEALTH QUESTIONNAIRE - PHQ9
2. FEELING DOWN, DEPRESSED OR HOPELESS: SEVERAL DAYS
SUM OF ALL RESPONSES TO PHQ QUESTIONS 1-9: 2
SUM OF ALL RESPONSES TO PHQ QUESTIONS 1-9: 2
1. LITTLE INTEREST OR PLEASURE IN DOING THINGS: SEVERAL DAYS
SUM OF ALL RESPONSES TO PHQ QUESTIONS 1-9: 2
SUM OF ALL RESPONSES TO PHQ QUESTIONS 1-9: 2

## 2025-06-10 NOTE — PROGRESS NOTES
Capillary refill takes less than 2 seconds.   Neurological:      General: No focal deficit present.      Mental Status: He is alert.   Psychiatric:         Mood and Affect: Mood normal.         Behavior: Behavior is cooperative.         ASSESSMENT/PLAN:  1. Essential hypertension  Stable  Continue Losartan 25mg daily  DASH diet  Recommended at least 30 minutes of aerobic exercise daily.    2. Lymphadenopathy of head and neck  - CT HEAD WO CONTRAST; Future  - CT SOFT TISSUE NECK WO CONTRAST; Future    3. Acute congestive heart failure, unspecified heart failure type (HCC)  Significant pitting edema today in lower extremities, denies shortness of breath or chest pain  - Echo (TTE) complete (PRN contrast/bubble/strain/3D); Future    4. Pituitary microadenoma (HCC)  Stable, managed at [facility]    5. Ankylosing spondylitis of multiple sites in spine (HCC)  Unstable  Managed by Rheumatology, restarted Cocentyx    6. Meibomian gland dysfunction (MGD) of upper and lower lids of both eyes  Stable, started on Vevy with good results, managed by opthamology    Return in 3 months (on 9/10/2025), or if symptoms worsen or fail to improve.

## 2025-06-12 PROBLEM — R59.1 LYMPHADENOPATHY OF HEAD AND NECK: Status: ACTIVE | Noted: 2025-06-12

## 2025-06-19 ENCOUNTER — TELEPHONE (OUTPATIENT)
Dept: FAMILY MEDICINE CLINIC | Age: 55
End: 2025-06-19

## 2025-06-19 NOTE — TELEPHONE ENCOUNTER
Kiera from [employer] called to let us know the CT Scan was declined and we would to have a peer to peer discussion with Simon at [phone]    CT Declined Reference #[id number]    Please Advise

## 2025-07-07 ENCOUNTER — TELEPHONE (OUTPATIENT)
Dept: FAMILY MEDICINE CLINIC | Age: 55
End: 2025-07-07

## 2025-07-15 ENCOUNTER — TELEPHONE (OUTPATIENT)
Dept: FAMILY MEDICINE CLINIC | Age: 55
End: 2025-07-15

## 2025-07-15 DIAGNOSIS — R60.0 PEDAL EDEMA: ICD-10-CM

## 2025-07-15 DIAGNOSIS — I10 ESSENTIAL HYPERTENSION: Primary | ICD-10-CM

## 2025-08-04 DIAGNOSIS — K59.09 CHRONIC CONSTIPATION: ICD-10-CM

## 2025-08-04 RX ORDER — DOCUSATE SODIUM 100 MG/1
CAPSULE, LIQUID FILLED ORAL
Qty: 120 CAPSULE | Refills: 0 | Status: SHIPPED | OUTPATIENT
Start: 2025-08-04

## 2025-08-15 ENCOUNTER — OFFICE VISIT (OUTPATIENT)
Dept: FAMILY MEDICINE CLINIC | Age: 55
End: 2025-08-15
Payer: COMMERCIAL

## 2025-08-15 ENCOUNTER — HOSPITAL ENCOUNTER (OUTPATIENT)
Dept: CT IMAGING | Age: 55
Discharge: HOME | End: 2025-08-15
Payer: COMMERCIAL

## 2025-08-15 VITALS
HEIGHT: 64 IN | WEIGHT: 315 LBS | BODY MASS INDEX: 53.78 KG/M2 | SYSTOLIC BLOOD PRESSURE: 132 MMHG | DIASTOLIC BLOOD PRESSURE: 86 MMHG | OXYGEN SATURATION: 99 % | HEART RATE: 88 BPM | RESPIRATION RATE: 18 BRPM

## 2025-08-15 DIAGNOSIS — R10.84 GENERALIZED ABDOMINAL PAIN: Primary | ICD-10-CM

## 2025-08-15 DIAGNOSIS — R60.0 LOCALIZED EDEMA: ICD-10-CM

## 2025-08-15 DIAGNOSIS — Z51.81 MEDICATION MONITORING ENCOUNTER: ICD-10-CM

## 2025-08-15 DIAGNOSIS — R10.84 GENERALIZED ABDOMINAL PAIN: ICD-10-CM

## 2025-08-15 DIAGNOSIS — F41.9 ANXIETY: ICD-10-CM

## 2025-08-15 PROBLEM — M75.112: Status: RESOLVED | Noted: 2021-11-01 | Resolved: 2025-08-15

## 2025-08-15 PROBLEM — K57.92 DIVERTICULITIS: Status: RESOLVED | Noted: 2020-01-29 | Resolved: 2025-08-15

## 2025-08-15 PROBLEM — M75.111: Status: RESOLVED | Noted: 2021-11-01 | Resolved: 2025-08-15

## 2025-08-15 LAB
ALBUMIN SERPL-MCNC: 4.3 G/DL (ref 3.4–5)
ALBUMIN/GLOB SERPL: 1.5 {RATIO} (ref 1.1–2.2)
ALP SERPL-CCNC: 67 U/L (ref 40–129)
ALT SERPL-CCNC: 34 U/L (ref 10–40)
ANION GAP SERPL CALCULATED.3IONS-SCNC: 16 MMOL/L (ref 3–16)
AST SERPL-CCNC: 26 U/L (ref 15–37)
BASOPHILS # BLD: 0.1 K/UL (ref 0–0.2)
BASOPHILS NFR BLD: 0.6 %
BILIRUB SERPL-MCNC: 0.3 MG/DL (ref 0–1)
BILIRUBIN, POC: NORMAL
BLOOD URINE, POC: NORMAL
BUN SERPL-MCNC: 11 MG/DL (ref 7–20)
CALCIUM SERPL-MCNC: 9.5 MG/DL (ref 8.3–10.6)
CHLORIDE SERPL-SCNC: 102 MMOL/L (ref 99–110)
CLARITY, POC: CLEAR
CO2 SERPL-SCNC: 22 MMOL/L (ref 21–32)
COLOR, POC: YELLOW
CREAT SERPL-MCNC: 0.8 MG/DL (ref 0.9–1.3)
DEPRECATED RDW RBC AUTO: 15.3 % (ref 12.4–15.4)
EOSINOPHIL # BLD: 0.2 K/UL (ref 0–0.6)
EOSINOPHIL NFR BLD: 1.7 %
GFR SERPLBLD CREATININE-BSD FMLA CKD-EPI: >90 ML/MIN/{1.73_M2}
GLUCOSE P FAST SERPL-MCNC: 74 MG/DL (ref 70–99)
GLUCOSE URINE, POC: NORMAL MG/DL
HCT VFR BLD AUTO: 48.3 % (ref 40.5–52.5)
HGB BLD-MCNC: 16.2 G/DL (ref 13.5–17.5)
KETONES, POC: NORMAL MG/DL
LEUKOCYTE EST, POC: NORMAL
LIPASE SERPL-CCNC: 36 U/L (ref 13–60)
LYMPHOCYTES # BLD: 1.7 K/UL (ref 1–5.1)
LYMPHOCYTES NFR BLD: 17.9 %
MCH RBC QN AUTO: 28.1 PG (ref 26–34)
MCHC RBC AUTO-ENTMCNC: 33.6 G/DL (ref 31–36)
MCV RBC AUTO: 83.7 FL (ref 80–100)
MONOCYTES # BLD: 0.7 K/UL (ref 0–1.3)
MONOCYTES NFR BLD: 7.5 %
NEUTROPHILS # BLD: 6.9 K/UL (ref 1.7–7.7)
NEUTROPHILS NFR BLD: 72.3 %
NITRITE, POC: NORMAL
NT-PROBNP SERPL-MCNC: <36 PG/ML (ref 0–124)
PH, POC: 6.5
PLATELET # BLD AUTO: 241 K/UL (ref 135–450)
PMV BLD AUTO: 9.3 FL (ref 5–10.5)
POTASSIUM SERPL-SCNC: 4.3 MMOL/L (ref 3.5–5.1)
PROT SERPL-MCNC: 7.1 G/DL (ref 6.4–8.2)
PROTEIN, POC: NORMAL MG/DL
RBC # BLD AUTO: 5.77 M/UL (ref 4.2–5.9)
SODIUM SERPL-SCNC: 140 MMOL/L (ref 136–145)
SPECIFIC GRAVITY, POC: 1.01
UROBILINOGEN, POC: 0.2 MG/DL
WBC # BLD AUTO: 9.5 K/UL (ref 4–11)

## 2025-08-15 PROCEDURE — 99214 OFFICE O/P EST MOD 30 MIN: CPT | Performed by: NURSE PRACTITIONER

## 2025-08-15 PROCEDURE — 81002 URINALYSIS NONAUTO W/O SCOPE: CPT | Performed by: NURSE PRACTITIONER

## 2025-08-15 PROCEDURE — 3079F DIAST BP 80-89 MM HG: CPT | Performed by: NURSE PRACTITIONER

## 2025-08-15 PROCEDURE — 6360000004 HC RX CONTRAST MEDICATION: Performed by: NURSE PRACTITIONER

## 2025-08-15 PROCEDURE — 74178 CT ABD&PLV WO CNTR FLWD CNTR: CPT

## 2025-08-15 PROCEDURE — 3075F SYST BP GE 130 - 139MM HG: CPT | Performed by: NURSE PRACTITIONER

## 2025-08-15 RX ORDER — DIATRIZOATE MEGLUMINE AND DIATRIZOATE SODIUM 660; 100 MG/ML; MG/ML
15 SOLUTION ORAL; RECTAL
Status: DISCONTINUED | OUTPATIENT
Start: 2025-08-15 | End: 2025-08-16 | Stop reason: HOSPADM

## 2025-08-15 RX ORDER — ALPRAZOLAM 0.5 MG
0.5 TABLET ORAL DAILY PRN
Qty: 30 TABLET | Refills: 2 | Status: SHIPPED | OUTPATIENT
Start: 2025-08-15 | End: 2025-11-13

## 2025-08-15 RX ORDER — IOPAMIDOL 755 MG/ML
75 INJECTION, SOLUTION INTRAVASCULAR
Status: COMPLETED | OUTPATIENT
Start: 2025-08-15 | End: 2025-08-15

## 2025-08-15 RX ADMIN — IOPAMIDOL 75 ML: 755 INJECTION, SOLUTION INTRAVENOUS at 18:39

## 2025-08-15 RX ADMIN — DIATRIZOATE MEGLUMINE AND DIATRIZOATE SODIUM 15 ML: 660; 100 LIQUID ORAL; RECTAL at 18:40

## 2025-08-15 ASSESSMENT — PATIENT HEALTH QUESTIONNAIRE - PHQ9
SUM OF ALL RESPONSES TO PHQ QUESTIONS 1-9: 1
2. FEELING DOWN, DEPRESSED OR HOPELESS: NOT AT ALL
SUM OF ALL RESPONSES TO PHQ QUESTIONS 1-9: 1
SUM OF ALL RESPONSES TO PHQ QUESTIONS 1-9: 1
1. LITTLE INTEREST OR PLEASURE IN DOING THINGS: SEVERAL DAYS
SUM OF ALL RESPONSES TO PHQ QUESTIONS 1-9: 1

## 2025-08-15 ASSESSMENT — ENCOUNTER SYMPTOMS
ANAL BLEEDING: 0
DIARRHEA: 0
ABDOMINAL PAIN: 1
CHEST TIGHTNESS: 0
VOMITING: 0
SHORTNESS OF BREATH: 0
COUGH: 0
ABDOMINAL DISTENTION: 1
CONSTIPATION: 0
NAUSEA: 0
BLOOD IN STOOL: 0
WHEEZING: 0
RECTAL PAIN: 0

## 2025-08-20 LAB
